# Patient Record
Sex: MALE | Race: WHITE | NOT HISPANIC OR LATINO
[De-identification: names, ages, dates, MRNs, and addresses within clinical notes are randomized per-mention and may not be internally consistent; named-entity substitution may affect disease eponyms.]

---

## 2017-06-20 ENCOUNTER — RESULT REVIEW (OUTPATIENT)
Age: 77
End: 2017-06-20

## 2017-08-01 ENCOUNTER — RESULT REVIEW (OUTPATIENT)
Age: 77
End: 2017-08-01

## 2017-09-06 RX ORDER — ACETAMINOPHEN 500 MG
975 TABLET ORAL ONCE
Qty: 0 | Refills: 0 | Status: COMPLETED | OUTPATIENT
Start: 2017-09-07 | End: 2017-09-07

## 2017-09-06 RX ORDER — FAMOTIDINE 10 MG/ML
20 INJECTION INTRAVENOUS ONCE
Qty: 0 | Refills: 0 | Status: COMPLETED | OUTPATIENT
Start: 2017-09-07 | End: 2017-09-07

## 2017-09-06 NOTE — ASU PATIENT PROFILE, ADULT - PMH
Deep vein thrombosis  post op  Depression    Gastroesophageal reflux disease    Hypertension    Osteoarthritis

## 2017-09-07 ENCOUNTER — OUTPATIENT (OUTPATIENT)
Dept: OUTPATIENT SERVICES | Facility: HOSPITAL | Age: 77
LOS: 1 days | Discharge: HOME | End: 2017-09-07

## 2017-09-07 VITALS
DIASTOLIC BLOOD PRESSURE: 82 MMHG | TEMPERATURE: 98 F | HEART RATE: 82 BPM | RESPIRATION RATE: 14 BRPM | SYSTOLIC BLOOD PRESSURE: 145 MMHG | OXYGEN SATURATION: 67 %

## 2017-09-07 VITALS
DIASTOLIC BLOOD PRESSURE: 88 MMHG | HEART RATE: 72 BPM | SYSTOLIC BLOOD PRESSURE: 148 MMHG | WEIGHT: 235.45 LBS | RESPIRATION RATE: 15 BRPM | TEMPERATURE: 99 F

## 2017-09-07 DIAGNOSIS — Z96.653 PRESENCE OF ARTIFICIAL KNEE JOINT, BILATERAL: Chronic | ICD-10-CM

## 2017-09-07 DIAGNOSIS — H26.9 UNSPECIFIED CATARACT: Chronic | ICD-10-CM

## 2017-09-07 DIAGNOSIS — Z98.52 VASECTOMY STATUS: Chronic | ICD-10-CM

## 2017-09-07 LAB
ANION GAP SERPL CALC-SCNC: 11 MMOL/L — SIGNIFICANT CHANGE UP (ref 5–17)
APTT BLD: 30.9 SEC — SIGNIFICANT CHANGE UP (ref 27.5–37.4)
BUN SERPL-MCNC: 23 MG/DL — SIGNIFICANT CHANGE UP (ref 7–23)
CALCIUM SERPL-MCNC: 8.9 MG/DL — SIGNIFICANT CHANGE UP (ref 8.5–10.1)
CHLORIDE SERPL-SCNC: 103 MMOL/L — SIGNIFICANT CHANGE UP (ref 96–108)
CO2 SERPL-SCNC: 24 MMOL/L — SIGNIFICANT CHANGE UP (ref 22–31)
CREAT SERPL-MCNC: 1.1 MG/DL — SIGNIFICANT CHANGE UP (ref 0.5–1.3)
GLUCOSE SERPL-MCNC: 105 MG/DL — HIGH (ref 70–99)
HCT VFR BLD CALC: 43.7 % — SIGNIFICANT CHANGE UP (ref 39–50)
HGB BLD-MCNC: 15 G/DL — SIGNIFICANT CHANGE UP (ref 13–17)
INR BLD: 1.06 RATIO — SIGNIFICANT CHANGE UP (ref 0.88–1.16)
MCHC RBC-ENTMCNC: 31.2 PG — SIGNIFICANT CHANGE UP (ref 27–34)
MCHC RBC-ENTMCNC: 34.4 GM/DL — SIGNIFICANT CHANGE UP (ref 32–36)
MCV RBC AUTO: 90.6 FL — SIGNIFICANT CHANGE UP (ref 80–100)
PLATELET # BLD AUTO: 316 K/UL — SIGNIFICANT CHANGE UP (ref 150–400)
POTASSIUM SERPL-MCNC: 3.5 MMOL/L — SIGNIFICANT CHANGE UP (ref 3.5–5.3)
POTASSIUM SERPL-SCNC: 3.5 MMOL/L — SIGNIFICANT CHANGE UP (ref 3.5–5.3)
PROTHROM AB SERPL-ACNC: 11.5 SEC — SIGNIFICANT CHANGE UP (ref 9.8–12.7)
RBC # BLD: 4.82 M/UL — SIGNIFICANT CHANGE UP (ref 4.2–5.8)
RBC # FLD: 11.7 % — SIGNIFICANT CHANGE UP (ref 10.3–14.5)
SODIUM SERPL-SCNC: 138 MMOL/L — SIGNIFICANT CHANGE UP (ref 135–145)
WBC # BLD: 9.1 K/UL — SIGNIFICANT CHANGE UP (ref 3.8–10.5)
WBC # FLD AUTO: 9.1 K/UL — SIGNIFICANT CHANGE UP (ref 3.8–10.5)

## 2017-09-07 RX ORDER — METOPROLOL TARTRATE 50 MG
50 TABLET ORAL EVERY 12 HOURS
Qty: 0 | Refills: 0 | Status: DISCONTINUED | OUTPATIENT
Start: 2017-09-07 | End: 2017-09-22

## 2017-09-07 RX ORDER — ACETAMINOPHEN 500 MG
1000 TABLET ORAL ONCE
Qty: 0 | Refills: 0 | Status: DISCONTINUED | OUTPATIENT
Start: 2017-09-07 | End: 2017-09-07

## 2017-09-07 RX ORDER — SODIUM CHLORIDE 9 MG/ML
1000 INJECTION INTRAMUSCULAR; INTRAVENOUS; SUBCUTANEOUS
Qty: 0 | Refills: 0 | Status: DISCONTINUED | OUTPATIENT
Start: 2017-09-07 | End: 2017-09-07

## 2017-09-07 RX ORDER — ONDANSETRON 8 MG/1
4 TABLET, FILM COATED ORAL EVERY 6 HOURS
Qty: 0 | Refills: 0 | Status: DISCONTINUED | OUTPATIENT
Start: 2017-09-07 | End: 2017-09-22

## 2017-09-07 RX ORDER — OXYCODONE HYDROCHLORIDE 5 MG/1
1 TABLET ORAL
Qty: 30 | Refills: 0 | OUTPATIENT
Start: 2017-09-07 | End: 2017-09-12

## 2017-09-07 RX ORDER — MUPIROCIN 20 MG/G
1 OINTMENT TOPICAL
Qty: 0 | Refills: 0 | COMMUNITY
Start: 2017-09-07 | End: 2017-09-11

## 2017-09-07 RX ORDER — OXYCODONE HYDROCHLORIDE 5 MG/1
5 TABLET ORAL EVERY 6 HOURS
Qty: 0 | Refills: 0 | Status: DISCONTINUED | OUTPATIENT
Start: 2017-09-07 | End: 2017-09-07

## 2017-09-07 RX ORDER — FENTANYL CITRATE 50 UG/ML
50 INJECTION INTRAVENOUS
Qty: 0 | Refills: 0 | Status: DISCONTINUED | OUTPATIENT
Start: 2017-09-07 | End: 2017-09-07

## 2017-09-07 RX ORDER — ONDANSETRON 8 MG/1
4 TABLET, FILM COATED ORAL ONCE
Qty: 0 | Refills: 0 | Status: DISCONTINUED | OUTPATIENT
Start: 2017-09-07 | End: 2017-09-07

## 2017-09-07 RX ADMIN — Medication 975 MILLIGRAM(S): at 06:55

## 2017-09-07 RX ADMIN — SODIUM CHLORIDE 75 MILLILITER(S): 9 INJECTION INTRAMUSCULAR; INTRAVENOUS; SUBCUTANEOUS at 08:45

## 2017-09-07 RX ADMIN — FAMOTIDINE 20 MILLIGRAM(S): 10 INJECTION INTRAVENOUS at 06:54

## 2017-09-07 NOTE — ASU DISCHARGE PLAN (ADULT/PEDIATRIC). - MEDICATION SUMMARY - MEDICATIONS TO TAKE
I will START or STAY ON the medications listed below when I get home from the hospital:    CeleBREX 200 mg oral capsule  --  by mouth   -- Indication: For VENTRAL HERNIA    citalopram 10 mg oral tablet  --  by mouth   -- Indication: For VENTRAL HERNIA    losartan-hydrochlorothiazide 100mg-25mg oral tablet  --  by mouth   -- Indication: For VENTRAL HERNIA    Bactroban 2% nasal ointment  -- 1 application into nose 2 times a day  -- Indication: For VENTRAL HERNIA    cinnamon 500 mg oral capsule  --  by mouth   -- Indication: For VENTRAL HERNIA    omega-3 polyunsaturated fatty acids 1200 mg oral capsule  --  by mouth   -- Indication: For VENTRAL HERNIA    Coenzyme Q10 60 mg oral tablet  --  by mouth   -- Indication: For VENTRAL HERNIA    pantoprazole 40 mg oral delayed release tablet  --  by mouth   -- Indication: For VENTRAL HERNIA    multivitamin  --     -- Indication: For VENTRAL HERNIA    Vitamin D3 1000 intl units oral capsule  --  by mouth   -- Indication: For VENTRAL HERNIA

## 2017-09-07 NOTE — BRIEF OPERATIVE NOTE - POST-OP DX
Umbilical hernia without obstruction or gangrene  09/07/2017    Walter Macedo  Ventral hernia without obstruction or gangrene  09/07/2017    Walter Macedo

## 2017-09-07 NOTE — BRIEF OPERATIVE NOTE - PROCEDURE
Umbilical hernia repair with mesh  09/07/2017    Active  RZINGALE  Ventral hernia repair with mesh  09/07/2017    Active  RZINGALE

## 2017-09-14 DIAGNOSIS — Z88.0 ALLERGY STATUS TO PENICILLIN: ICD-10-CM

## 2017-09-14 DIAGNOSIS — K21.9 GASTRO-ESOPHAGEAL REFLUX DISEASE WITHOUT ESOPHAGITIS: ICD-10-CM

## 2017-09-14 DIAGNOSIS — I10 ESSENTIAL (PRIMARY) HYPERTENSION: ICD-10-CM

## 2017-09-14 DIAGNOSIS — Z88.5 ALLERGY STATUS TO NARCOTIC AGENT: ICD-10-CM

## 2017-09-14 DIAGNOSIS — Z86.718 PERSONAL HISTORY OF OTHER VENOUS THROMBOSIS AND EMBOLISM: ICD-10-CM

## 2017-09-14 DIAGNOSIS — K42.9 UMBILICAL HERNIA WITHOUT OBSTRUCTION OR GANGRENE: ICD-10-CM

## 2017-11-27 PROBLEM — I10 ESSENTIAL (PRIMARY) HYPERTENSION: Chronic | Status: ACTIVE | Noted: 2017-09-06

## 2017-11-27 PROBLEM — F32.9 MAJOR DEPRESSIVE DISORDER, SINGLE EPISODE, UNSPECIFIED: Chronic | Status: ACTIVE | Noted: 2017-09-06

## 2017-11-27 PROBLEM — K21.9 GASTRO-ESOPHAGEAL REFLUX DISEASE WITHOUT ESOPHAGITIS: Chronic | Status: ACTIVE | Noted: 2017-09-06

## 2017-11-27 PROBLEM — M19.90 UNSPECIFIED OSTEOARTHRITIS, UNSPECIFIED SITE: Chronic | Status: ACTIVE | Noted: 2017-09-06

## 2017-12-01 ENCOUNTER — FORM ENCOUNTER (OUTPATIENT)
Age: 77
End: 2017-12-01

## 2017-12-01 ENCOUNTER — APPOINTMENT (OUTPATIENT)
Dept: ORTHOPEDIC SURGERY | Facility: CLINIC | Age: 77
End: 2017-12-01
Payer: MEDICARE

## 2017-12-01 VITALS
BODY MASS INDEX: 33.59 KG/M2 | WEIGHT: 248 LBS | SYSTOLIC BLOOD PRESSURE: 156 MMHG | HEIGHT: 72 IN | HEART RATE: 75 BPM | DIASTOLIC BLOOD PRESSURE: 97 MMHG | TEMPERATURE: 98.7 F

## 2017-12-01 DIAGNOSIS — Z96.652 UNSPECIFIED COMPLICATION OF INTERNAL ORTHOPEDIC PROSTHETIC DEVICE, IMPLANT AND GRAFT, INITIAL ENCOUNTER: ICD-10-CM

## 2017-12-01 DIAGNOSIS — Z96.652 PRESENCE OF LEFT ARTIFICIAL KNEE JOINT: ICD-10-CM

## 2017-12-01 DIAGNOSIS — T84.9XXA UNSPECIFIED COMPLICATION OF INTERNAL ORTHOPEDIC PROSTHETIC DEVICE, IMPLANT AND GRAFT, INITIAL ENCOUNTER: ICD-10-CM

## 2017-12-01 DIAGNOSIS — M25.462 EFFUSION, LEFT KNEE: ICD-10-CM

## 2017-12-01 PROCEDURE — 99203 OFFICE O/P NEW LOW 30 MIN: CPT | Mod: 25

## 2017-12-01 PROCEDURE — 73562 X-RAY EXAM OF KNEE 3: CPT | Mod: LT

## 2017-12-01 PROCEDURE — 20610 DRAIN/INJ JOINT/BURSA W/O US: CPT | Mod: LT

## 2017-12-01 RX ORDER — CITALOPRAM HYDROBROMIDE 20 MG/1
20 TABLET, FILM COATED ORAL
Qty: 30 | Refills: 0 | Status: ACTIVE | COMMUNITY
Start: 2017-02-14

## 2017-12-01 RX ORDER — OMEPRAZOLE 40 MG/1
40 CAPSULE, DELAYED RELEASE ORAL
Qty: 30 | Refills: 0 | Status: ACTIVE | COMMUNITY
Start: 2017-04-18

## 2017-12-01 RX ORDER — LOSARTAN POTASSIUM AND HYDROCHLOROTHIAZIDE 25; 100 MG/1; MG/1
100-25 TABLET ORAL
Qty: 30 | Refills: 0 | Status: ACTIVE | COMMUNITY
Start: 2017-04-18

## 2017-12-01 RX ORDER — TAMSULOSIN HYDROCHLORIDE 0.4 MG/1
0.4 CAPSULE ORAL
Qty: 30 | Refills: 0 | Status: ACTIVE | COMMUNITY
Start: 2017-07-07

## 2017-12-01 RX ORDER — OFLOXACIN 3 MG/ML
0.3 SOLUTION/ DROPS OPHTHALMIC
Qty: 10 | Refills: 0 | Status: ACTIVE | COMMUNITY
Start: 2017-08-16

## 2017-12-02 ENCOUNTER — APPOINTMENT (OUTPATIENT)
Dept: CT IMAGING | Facility: CLINIC | Age: 77
End: 2017-12-02
Payer: MEDICARE

## 2017-12-02 ENCOUNTER — OUTPATIENT (OUTPATIENT)
Dept: OUTPATIENT SERVICES | Facility: HOSPITAL | Age: 77
LOS: 1 days | End: 2017-12-02
Payer: MEDICARE

## 2017-12-02 DIAGNOSIS — Z96.653 PRESENCE OF ARTIFICIAL KNEE JOINT, BILATERAL: Chronic | ICD-10-CM

## 2017-12-02 DIAGNOSIS — M25.462 EFFUSION, LEFT KNEE: ICD-10-CM

## 2017-12-02 DIAGNOSIS — Z98.52 VASECTOMY STATUS: Chronic | ICD-10-CM

## 2017-12-02 DIAGNOSIS — H26.9 UNSPECIFIED CATARACT: Chronic | ICD-10-CM

## 2017-12-02 PROCEDURE — 73700 CT LOWER EXTREMITY W/O DYE: CPT

## 2017-12-02 PROCEDURE — 73700 CT LOWER EXTREMITY W/O DYE: CPT | Mod: 26,LT

## 2017-12-04 ENCOUNTER — APPOINTMENT (OUTPATIENT)
Dept: CT IMAGING | Facility: CLINIC | Age: 77
End: 2017-12-04

## 2017-12-05 PROBLEM — T84.9XXA: Status: ACTIVE | Noted: 2017-12-05

## 2017-12-05 LAB
B PERT IGG+IGM PNL SER: NORMAL
COLOR FLD: NORMAL
EOSINOPHIL # FLD MANUAL: 1 %
FLUID INTAKE SUBSTANCE CLASS: NORMAL
LYMPHOCYTES # FLD MANUAL: 17 %
MONOS+MACROS NFR FLD MANUAL: 9 %
NEUTS SEG # FLD MANUAL: 73 %
RBC # FLD MANUAL: ABNORMAL /UL
TOTAL CELLS COUNTED FLD: 1454 /UL
TUBE TYPE: NORMAL

## 2017-12-11 LAB — BACTERIA FLD CULT: NORMAL

## 2017-12-18 ENCOUNTER — APPOINTMENT (OUTPATIENT)
Dept: ORTHOPEDIC SURGERY | Facility: HOSPITAL | Age: 77
End: 2017-12-18

## 2018-01-02 ENCOUNTER — RESULT REVIEW (OUTPATIENT)
Age: 78
End: 2018-01-02

## 2018-01-02 ENCOUNTER — OUTPATIENT (OUTPATIENT)
Dept: OUTPATIENT SERVICES | Facility: HOSPITAL | Age: 78
LOS: 1 days | End: 2018-01-02
Payer: MEDICARE

## 2018-01-02 VITALS
WEIGHT: 255.74 LBS | HEART RATE: 77 BPM | TEMPERATURE: 98 F | HEIGHT: 70 IN | RESPIRATION RATE: 14 BRPM | DIASTOLIC BLOOD PRESSURE: 88 MMHG | SYSTOLIC BLOOD PRESSURE: 130 MMHG

## 2018-01-02 DIAGNOSIS — T84.9XXA UNSPECIFIED COMPLICATION OF INTERNAL ORTHOPEDIC PROSTHETIC DEVICE, IMPLANT AND GRAFT, INITIAL ENCOUNTER: ICD-10-CM

## 2018-01-02 DIAGNOSIS — Z98.49 CATARACT EXTRACTION STATUS, UNSPECIFIED EYE: Chronic | ICD-10-CM

## 2018-01-02 DIAGNOSIS — Z98.890 OTHER SPECIFIED POSTPROCEDURAL STATES: Chronic | ICD-10-CM

## 2018-01-02 DIAGNOSIS — H26.9 UNSPECIFIED CATARACT: Chronic | ICD-10-CM

## 2018-01-02 DIAGNOSIS — Z96.653 PRESENCE OF ARTIFICIAL KNEE JOINT, BILATERAL: Chronic | ICD-10-CM

## 2018-01-02 DIAGNOSIS — Z96.652 PRESENCE OF LEFT ARTIFICIAL KNEE JOINT: ICD-10-CM

## 2018-01-02 DIAGNOSIS — Z01.818 ENCOUNTER FOR OTHER PREPROCEDURAL EXAMINATION: ICD-10-CM

## 2018-01-02 DIAGNOSIS — M19.90 UNSPECIFIED OSTEOARTHRITIS, UNSPECIFIED SITE: ICD-10-CM

## 2018-01-02 DIAGNOSIS — Z98.52 VASECTOMY STATUS: Chronic | ICD-10-CM

## 2018-01-02 LAB
ALBUMIN SERPL ELPH-MCNC: 3.6 G/DL — SIGNIFICANT CHANGE UP (ref 3.3–5)
ALP SERPL-CCNC: 50 U/L — SIGNIFICANT CHANGE UP (ref 30–120)
ALT FLD-CCNC: 41 U/L DA — SIGNIFICANT CHANGE UP (ref 10–60)
ANION GAP SERPL CALC-SCNC: 8 MMOL/L — SIGNIFICANT CHANGE UP (ref 5–17)
APTT BLD: 32.1 SEC — SIGNIFICANT CHANGE UP (ref 27.5–37.4)
AST SERPL-CCNC: 27 U/L — SIGNIFICANT CHANGE UP (ref 10–40)
BILIRUB SERPL-MCNC: 0.8 MG/DL — SIGNIFICANT CHANGE UP (ref 0.2–1.2)
BLD GP AB SCN SERPL QL: SIGNIFICANT CHANGE UP
BUN SERPL-MCNC: 28 MG/DL — HIGH (ref 7–23)
CALCIUM SERPL-MCNC: 9.2 MG/DL — SIGNIFICANT CHANGE UP (ref 8.4–10.5)
CHLORIDE SERPL-SCNC: 104 MMOL/L — SIGNIFICANT CHANGE UP (ref 96–108)
CO2 SERPL-SCNC: 29 MMOL/L — SIGNIFICANT CHANGE UP (ref 22–31)
CREAT SERPL-MCNC: 1.09 MG/DL — SIGNIFICANT CHANGE UP (ref 0.5–1.3)
GLUCOSE SERPL-MCNC: 113 MG/DL — HIGH (ref 70–99)
HCT VFR BLD CALC: 41.1 % — SIGNIFICANT CHANGE UP (ref 39–50)
HGB BLD-MCNC: 14 G/DL — SIGNIFICANT CHANGE UP (ref 13–17)
INR BLD: 1.05 RATIO — SIGNIFICANT CHANGE UP (ref 0.88–1.16)
MCHC RBC-ENTMCNC: 30.5 PG — SIGNIFICANT CHANGE UP (ref 27–34)
MCHC RBC-ENTMCNC: 34 GM/DL — SIGNIFICANT CHANGE UP (ref 32–36)
MCV RBC AUTO: 89.9 FL — SIGNIFICANT CHANGE UP (ref 80–100)
MRSA PCR RESULT.: SIGNIFICANT CHANGE UP
PLATELET # BLD AUTO: 299 K/UL — SIGNIFICANT CHANGE UP (ref 150–400)
POTASSIUM SERPL-MCNC: 4.2 MMOL/L — SIGNIFICANT CHANGE UP (ref 3.5–5.3)
POTASSIUM SERPL-SCNC: 4.2 MMOL/L — SIGNIFICANT CHANGE UP (ref 3.5–5.3)
PROT SERPL-MCNC: 7.9 G/DL — SIGNIFICANT CHANGE UP (ref 6–8.3)
PROTHROM AB SERPL-ACNC: 11.5 SEC — SIGNIFICANT CHANGE UP (ref 9.8–12.7)
RBC # BLD: 4.58 M/UL — SIGNIFICANT CHANGE UP (ref 4.2–5.8)
RBC # FLD: 12 % — SIGNIFICANT CHANGE UP (ref 10.3–14.5)
S AUREUS DNA NOSE QL NAA+PROBE: DETECTED
SODIUM SERPL-SCNC: 141 MMOL/L — SIGNIFICANT CHANGE UP (ref 135–145)
WBC # BLD: 7 K/UL — SIGNIFICANT CHANGE UP (ref 3.8–10.5)
WBC # FLD AUTO: 7 K/UL — SIGNIFICANT CHANGE UP (ref 3.8–10.5)

## 2018-01-02 PROCEDURE — 93010 ELECTROCARDIOGRAM REPORT: CPT | Mod: NC

## 2018-01-02 PROCEDURE — 85730 THROMBOPLASTIN TIME PARTIAL: CPT

## 2018-01-02 PROCEDURE — 86901 BLOOD TYPING SEROLOGIC RH(D): CPT

## 2018-01-02 PROCEDURE — 87640 STAPH A DNA AMP PROBE: CPT

## 2018-01-02 PROCEDURE — 86900 BLOOD TYPING SEROLOGIC ABO: CPT

## 2018-01-02 PROCEDURE — 87641 MR-STAPH DNA AMP PROBE: CPT

## 2018-01-02 PROCEDURE — 85610 PROTHROMBIN TIME: CPT

## 2018-01-02 PROCEDURE — 86850 RBC ANTIBODY SCREEN: CPT

## 2018-01-02 PROCEDURE — 80053 COMPREHEN METABOLIC PANEL: CPT

## 2018-01-02 PROCEDURE — 93005 ELECTROCARDIOGRAM TRACING: CPT

## 2018-01-02 PROCEDURE — 36415 COLL VENOUS BLD VENIPUNCTURE: CPT

## 2018-01-02 PROCEDURE — G0463: CPT

## 2018-01-02 PROCEDURE — 85027 COMPLETE CBC AUTOMATED: CPT

## 2018-01-02 RX ORDER — CHOLECALCIFEROL (VITAMIN D3) 125 MCG
0 CAPSULE ORAL
Qty: 0 | Refills: 0 | COMMUNITY

## 2018-01-02 RX ORDER — CITALOPRAM 10 MG/1
0 TABLET, FILM COATED ORAL
Qty: 0 | Refills: 0 | COMMUNITY

## 2018-01-02 RX ORDER — UBIDECARENONE 100 MG
0 CAPSULE ORAL
Qty: 0 | Refills: 0 | COMMUNITY

## 2018-01-02 RX ORDER — CINNAMON BARK 500 MG
0 CAPSULE ORAL
Qty: 0 | Refills: 0 | COMMUNITY

## 2018-01-02 RX ORDER — PANTOPRAZOLE SODIUM 20 MG/1
0 TABLET, DELAYED RELEASE ORAL
Qty: 0 | Refills: 0 | COMMUNITY

## 2018-01-02 RX ORDER — CELECOXIB 200 MG/1
0 CAPSULE ORAL
Qty: 0 | Refills: 0 | COMMUNITY

## 2018-01-02 NOTE — H&P PST ADULT - FAMILY HISTORY
Family history of lung cancer     Mother  Still living? No  Family history of brain tumor, Age at diagnosis: Age Unknown

## 2018-01-02 NOTE — H&P PST ADULT - NSANTHOSAYNRD_GEN_A_CORE
No. GERMAN screening performed.  STOP BANG Legend: 0-2 = LOW Risk; 3-4 = INTERMEDIATE Risk; 5-8 = HIGH Risk

## 2018-01-02 NOTE — H&P PST ADULT - PSH
H/O total knee replacement, bilateral  left knee replacemt 2001, revision left knee replacement using cement 2010 ? h/o staph and strep infection   right knee replacement 2001 , revision right knee replacement 2002?, multiple right  knee surgeries done due to staph infection  H/O vasectomy    S/P cataract surgery  bilateral 2017  S/P hernia repair  2017

## 2018-01-02 NOTE — H&P PST ADULT - HISTORY OF PRESENT ILLNESS
This is a 76 y/o male who had undergone revision left total knee replacement using cement  5-6 years ago presents with complaint of left knee pain , swelling and buckling . patient states 2 months ago he missed on stoop and twisted his left knee , since then he has been experiencing severe shocking pain, knee giving away  and worse pain 8/10 with steps up and down and sitting long period of time . patient had multiple knee surgeries in the past due to staph and strep infections in his knee replacement . scheduled for revision left knee replacement on 1/18/18

## 2018-01-02 NOTE — H&P PST ADULT - RS GEN PE MLT RESP DETAILS PC
clear to auscultation bilaterally/good air movement/no rhonchi/no rales/breath sounds equal/no wheezes

## 2018-01-02 NOTE — H&P PST ADULT - PMH
Deep vein thrombosis  right leg s/p right knee replacement 2007 treated with coumadin x 6 months  Depression    Gastroesophageal reflux disease    Hypertension    Osteoarthritis

## 2018-01-03 PROBLEM — I82.409 ACUTE EMBOLISM AND THROMBOSIS OF UNSPECIFIED DEEP VEINS OF UNSPECIFIED LOWER EXTREMITY: Chronic | Status: ACTIVE | Noted: 2017-09-06

## 2018-01-03 RX ORDER — MUPIROCIN 20 MG/G
1 OINTMENT TOPICAL
Qty: 1 | Refills: 0 | OUTPATIENT
Start: 2018-01-03 | End: 2018-01-07

## 2018-01-03 NOTE — PROGRESS NOTE ADULT - SUBJECTIVE AND OBJECTIVE BOX
Nasal culture PCR results: staph aureus detected  Topical Mupirocin escribed  Spoke to patient; understands treatment

## 2018-01-09 RX ORDER — APREPITANT 80 MG/1
40 CAPSULE ORAL ONCE
Qty: 0 | Refills: 0 | Status: COMPLETED | OUTPATIENT
Start: 2018-01-18 | End: 2018-01-18

## 2018-01-09 RX ORDER — SODIUM CHLORIDE 9 MG/ML
1000 INJECTION, SOLUTION INTRAVENOUS
Qty: 0 | Refills: 0 | Status: DISCONTINUED | OUTPATIENT
Start: 2018-01-18 | End: 2018-01-18

## 2018-01-17 RX ORDER — SENNA PLUS 8.6 MG/1
2 TABLET ORAL AT BEDTIME
Qty: 0 | Refills: 0 | Status: DISCONTINUED | OUTPATIENT
Start: 2018-01-18 | End: 2018-01-20

## 2018-01-17 RX ORDER — MAGNESIUM HYDROXIDE 400 MG/1
30 TABLET, CHEWABLE ORAL DAILY
Qty: 0 | Refills: 0 | Status: DISCONTINUED | OUTPATIENT
Start: 2018-01-18 | End: 2018-01-20

## 2018-01-17 RX ORDER — DOCUSATE SODIUM 100 MG
100 CAPSULE ORAL THREE TIMES A DAY
Qty: 0 | Refills: 0 | Status: DISCONTINUED | OUTPATIENT
Start: 2018-01-18 | End: 2018-01-20

## 2018-01-17 RX ORDER — SODIUM CHLORIDE 9 MG/ML
1000 INJECTION, SOLUTION INTRAVENOUS
Qty: 0 | Refills: 0 | Status: DISCONTINUED | OUTPATIENT
Start: 2018-01-18 | End: 2018-01-20

## 2018-01-17 RX ORDER — ONDANSETRON 8 MG/1
4 TABLET, FILM COATED ORAL EVERY 6 HOURS
Qty: 0 | Refills: 0 | Status: DISCONTINUED | OUTPATIENT
Start: 2018-01-18 | End: 2018-01-20

## 2018-01-17 NOTE — PROGRESS NOTE ADULT - SUBJECTIVE AND OBJECTIVE BOX
Presurgical evaluation:    Allergies:  penicillin: Hives  Intolerance:  codeine: Nausea    Home Medications: (based on H&P only; please confirm with patient and clearances)  · 	Vitamin D3 1000 intl units orally once a day  · 	pantoprazole 40 mg once a day  · 	CeleBREX 200 mg once a day  · 	Cinnamon 2 times a day  · 	citalopram 20 mg once a day  · 	Co Q-10 200mg once a day  · 	multivitamin once a day  · 	losartan-hydrochlorothiazide 100mg-25mg once a day  · 	tamsulosin 0.4 mg once a day  · 	biotin 5000 mcg once a day  · 	omega-3 polyunsaturated fatty acids 1200 mg     Past Medical History:  Deep vein thrombosis  right leg s/p right knee replacement 2007 treated with coumadin x 6 months  Depression    Gastroesophageal reflux disease    Hypertension    Osteoarthritis.    Past Surgical History:  H/O total knee replacement, bilateral  left knee replacemt 2001, revision left knee replacement using cement 2010 ? h/o staph and strep infection   right knee replacement 2001 , revision right knee replacement 2002?, multiple right  knee surgeries done due to staph infection  S/P hernia repair  2017    PST values of interest:  SCr 1.09 mg/dL (WNL)  LFTs WNL    Recommendations:  1.	IV TXA  2.	Acetaminophen and celecoxib for multimodal pain management  3.	VTE prophlaxis: (Caprini 12): Eliquis 2.5mg q12h x 12 days, then ASA EC 162mg q12h x 4 weeks Presurgical evaluation:    Allergies:  penicillin: Hives  Intolerance:  codeine: Nausea    Home Medications: (based on H&P only; please confirm with patient and clearances)  · 	Vitamin D3 1000 intl units orally once a day  · 	pantoprazole 40 mg once a day  · 	CeleBREX 200 mg once a day  · 	Cinnamon 2 times a day  · 	citalopram 20 mg once a day  · 	Co Q-10 200mg once a day  · 	multivitamin once a day  · 	losartan-hydrochlorothiazide 100mg-25mg once a day  · 	tamsulosin 0.4 mg once a day  · 	biotin 5000 mcg once a day  · 	omega-3 polyunsaturated fatty acids 1200 mg     Past Medical History:  Deep vein thrombosis  right leg s/p right knee replacement 2007 treated with coumadin x 6 months  Depression    Gastroesophageal reflux disease    Hypertension    Osteoarthritis.    Past Surgical History:  H/O total knee replacement, bilateral  left knee replacemt 2001, revision left knee replacement using cement 2010 ? h/o staph and strep infection   right knee replacement 2001 , revision right knee replacement 2002?, multiple right  knee surgeries done due to staph infection  S/P hernia repair  2017    PST values of interest:  SCr 1.09 mg/dL (WNL)  LFTs WNL    Recommendations:  1.	IV TXA  2.	Acetaminophen and celecoxib for multimodal pain management  3.	VTE prophlaxis: (Caprini 12): Eliquis 2.5mg q12h x 12 days, then ASA EC 162mg q12h x 4 weeks  4.	Hold supplements until Aspirin 162mg q12h completed

## 2018-01-18 ENCOUNTER — APPOINTMENT (OUTPATIENT)
Dept: ORTHOPEDIC SURGERY | Facility: HOSPITAL | Age: 78
End: 2018-01-18

## 2018-01-18 ENCOUNTER — RESULT REVIEW (OUTPATIENT)
Age: 78
End: 2018-01-18

## 2018-01-18 ENCOUNTER — INPATIENT (INPATIENT)
Facility: HOSPITAL | Age: 78
LOS: 1 days | Discharge: HOME | DRG: 468 | End: 2018-01-20
Attending: ORTHOPAEDIC SURGERY | Admitting: ORTHOPAEDIC SURGERY
Payer: MEDICARE

## 2018-01-18 VITALS
OXYGEN SATURATION: 98 % | SYSTOLIC BLOOD PRESSURE: 142 MMHG | DIASTOLIC BLOOD PRESSURE: 83 MMHG | HEIGHT: 72 IN | HEART RATE: 81 BPM | RESPIRATION RATE: 19 BRPM | WEIGHT: 242.51 LBS | TEMPERATURE: 98 F

## 2018-01-18 DIAGNOSIS — Z96.652 PRESENCE OF LEFT ARTIFICIAL KNEE JOINT: ICD-10-CM

## 2018-01-18 DIAGNOSIS — K21.9 GASTRO-ESOPHAGEAL REFLUX DISEASE WITHOUT ESOPHAGITIS: ICD-10-CM

## 2018-01-18 DIAGNOSIS — N40.0 BENIGN PROSTATIC HYPERPLASIA WITHOUT LOWER URINARY TRACT SYMPTOMS: ICD-10-CM

## 2018-01-18 DIAGNOSIS — Z98.890 OTHER SPECIFIED POSTPROCEDURAL STATES: Chronic | ICD-10-CM

## 2018-01-18 DIAGNOSIS — M19.90 UNSPECIFIED OSTEOARTHRITIS, UNSPECIFIED SITE: ICD-10-CM

## 2018-01-18 DIAGNOSIS — T84.9XXA UNSPECIFIED COMPLICATION OF INTERNAL ORTHOPEDIC PROSTHETIC DEVICE, IMPLANT AND GRAFT, INITIAL ENCOUNTER: ICD-10-CM

## 2018-01-18 DIAGNOSIS — F32.9 MAJOR DEPRESSIVE DISORDER, SINGLE EPISODE, UNSPECIFIED: ICD-10-CM

## 2018-01-18 DIAGNOSIS — Z96.653 PRESENCE OF ARTIFICIAL KNEE JOINT, BILATERAL: Chronic | ICD-10-CM

## 2018-01-18 DIAGNOSIS — Z98.49 CATARACT EXTRACTION STATUS, UNSPECIFIED EYE: Chronic | ICD-10-CM

## 2018-01-18 DIAGNOSIS — Z98.52 VASECTOMY STATUS: Chronic | ICD-10-CM

## 2018-01-18 DIAGNOSIS — I10 ESSENTIAL (PRIMARY) HYPERTENSION: ICD-10-CM

## 2018-01-18 LAB
ANION GAP SERPL CALC-SCNC: 10 MMOL/L — SIGNIFICANT CHANGE UP (ref 5–17)
B PERT IGG+IGM PNL SER: ABNORMAL
BUN SERPL-MCNC: 20 MG/DL — SIGNIFICANT CHANGE UP (ref 7–23)
CALCIUM SERPL-MCNC: 8.9 MG/DL — SIGNIFICANT CHANGE UP (ref 8.4–10.5)
CHLORIDE SERPL-SCNC: 105 MMOL/L — SIGNIFICANT CHANGE UP (ref 96–108)
CO2 SERPL-SCNC: 27 MMOL/L — SIGNIFICANT CHANGE UP (ref 22–31)
COLOR FLD: SIGNIFICANT CHANGE UP
CREAT SERPL-MCNC: 1.11 MG/DL — SIGNIFICANT CHANGE UP (ref 0.5–1.3)
FLUID INTAKE SUBSTANCE CLASS: SIGNIFICANT CHANGE UP
GLUCOSE SERPL-MCNC: 144 MG/DL — HIGH (ref 70–99)
HCT VFR BLD CALC: 41.8 % — SIGNIFICANT CHANGE UP (ref 39–50)
HGB BLD-MCNC: 13.8 G/DL — SIGNIFICANT CHANGE UP (ref 13–17)
MCHC RBC-ENTMCNC: 30.7 PG — SIGNIFICANT CHANGE UP (ref 27–34)
MCHC RBC-ENTMCNC: 33.1 GM/DL — SIGNIFICANT CHANGE UP (ref 32–36)
MCV RBC AUTO: 92.6 FL — SIGNIFICANT CHANGE UP (ref 80–100)
PLATELET # BLD AUTO: 328 K/UL — SIGNIFICANT CHANGE UP (ref 150–400)
POTASSIUM SERPL-MCNC: 4 MMOL/L — SIGNIFICANT CHANGE UP (ref 3.5–5.3)
POTASSIUM SERPL-SCNC: 4 MMOL/L — SIGNIFICANT CHANGE UP (ref 3.5–5.3)
RBC # BLD: 4.51 M/UL — SIGNIFICANT CHANGE UP (ref 4.2–5.8)
RBC # FLD: 11.4 % — SIGNIFICANT CHANGE UP (ref 10.3–14.5)
RCV VOL RI: HIGH /UL (ref 0–5)
SODIUM SERPL-SCNC: 142 MMOL/L — SIGNIFICANT CHANGE UP (ref 135–145)
TOTAL NUCLEATED CELL COUNT, BODY FLUID: 940 /UL — HIGH (ref 0–5)
TUBE TYPE: SIGNIFICANT CHANGE UP
WBC # BLD: 12.9 K/UL — HIGH (ref 3.8–10.5)
WBC # FLD AUTO: 12.9 K/UL — HIGH (ref 3.8–10.5)

## 2018-01-18 PROCEDURE — 88305 TISSUE EXAM BY PATHOLOGIST: CPT | Mod: 26

## 2018-01-18 PROCEDURE — 88300 SURGICAL PATH GROSS: CPT | Mod: 26

## 2018-01-18 PROCEDURE — 99223 1ST HOSP IP/OBS HIGH 75: CPT

## 2018-01-18 PROCEDURE — 27487 REVISE/REPLACE KNEE JOINT: CPT | Mod: LT

## 2018-01-18 PROCEDURE — 73562 X-RAY EXAM OF KNEE 3: CPT | Mod: 26,LT

## 2018-01-18 PROCEDURE — 88311 DECALCIFY TISSUE: CPT | Mod: 26

## 2018-01-18 PROCEDURE — 88302 TISSUE EXAM BY PATHOLOGIST: CPT | Mod: 26

## 2018-01-18 RX ORDER — HYDROMORPHONE HYDROCHLORIDE 2 MG/ML
0.5 INJECTION INTRAMUSCULAR; INTRAVENOUS; SUBCUTANEOUS
Qty: 0 | Refills: 0 | Status: DISCONTINUED | OUTPATIENT
Start: 2018-01-18 | End: 2018-01-18

## 2018-01-18 RX ORDER — ACETAMINOPHEN 500 MG
1000 TABLET ORAL ONCE
Qty: 0 | Refills: 0 | Status: COMPLETED | OUTPATIENT
Start: 2018-01-18 | End: 2018-01-18

## 2018-01-18 RX ORDER — VANCOMYCIN HCL 1 G
1750 VIAL (EA) INTRAVENOUS ONCE
Qty: 0 | Refills: 0 | Status: COMPLETED | OUTPATIENT
Start: 2018-01-18 | End: 2018-01-18

## 2018-01-18 RX ORDER — CELECOXIB 200 MG/1
200 CAPSULE ORAL
Qty: 0 | Refills: 0 | Status: DISCONTINUED | OUTPATIENT
Start: 2018-01-18 | End: 2018-01-19

## 2018-01-18 RX ORDER — PANTOPRAZOLE SODIUM 20 MG/1
40 TABLET, DELAYED RELEASE ORAL
Qty: 0 | Refills: 0 | Status: DISCONTINUED | OUTPATIENT
Start: 2018-01-18 | End: 2018-01-18

## 2018-01-18 RX ORDER — OXYCODONE HYDROCHLORIDE 5 MG/1
5 TABLET ORAL
Qty: 0 | Refills: 0 | Status: DISCONTINUED | OUTPATIENT
Start: 2018-01-18 | End: 2018-01-20

## 2018-01-18 RX ORDER — PANTOPRAZOLE SODIUM 20 MG/1
40 TABLET, DELAYED RELEASE ORAL
Qty: 0 | Refills: 0 | Status: DISCONTINUED | OUTPATIENT
Start: 2018-01-18 | End: 2018-01-20

## 2018-01-18 RX ORDER — SENNA PLUS 8.6 MG/1
1 TABLET ORAL AT BEDTIME
Qty: 0 | Refills: 0 | Status: DISCONTINUED | OUTPATIENT
Start: 2018-01-18 | End: 2018-01-18

## 2018-01-18 RX ORDER — ASPIRIN/CALCIUM CARB/MAGNESIUM 324 MG
162 TABLET ORAL EVERY 12 HOURS
Qty: 0 | Refills: 0 | Status: DISCONTINUED | OUTPATIENT
Start: 2018-01-19 | End: 2018-01-19

## 2018-01-18 RX ORDER — OXYCODONE HYDROCHLORIDE 5 MG/1
10 TABLET ORAL
Qty: 0 | Refills: 0 | Status: DISCONTINUED | OUTPATIENT
Start: 2018-01-18 | End: 2018-01-20

## 2018-01-18 RX ORDER — ACETAMINOPHEN 500 MG
1000 TABLET ORAL ONCE
Qty: 0 | Refills: 0 | Status: COMPLETED | OUTPATIENT
Start: 2018-01-19 | End: 2018-01-19

## 2018-01-18 RX ORDER — CITALOPRAM 10 MG/1
20 TABLET, FILM COATED ORAL DAILY
Qty: 0 | Refills: 0 | Status: DISCONTINUED | OUTPATIENT
Start: 2018-01-18 | End: 2018-01-20

## 2018-01-18 RX ORDER — SODIUM CHLORIDE 9 MG/ML
1000 INJECTION, SOLUTION INTRAVENOUS
Qty: 0 | Refills: 0 | Status: DISCONTINUED | OUTPATIENT
Start: 2018-01-18 | End: 2018-01-18

## 2018-01-18 RX ORDER — POLYETHYLENE GLYCOL 3350 17 G/17G
17 POWDER, FOR SOLUTION ORAL DAILY
Qty: 0 | Refills: 0 | Status: DISCONTINUED | OUTPATIENT
Start: 2018-01-18 | End: 2018-01-20

## 2018-01-18 RX ORDER — ONDANSETRON 8 MG/1
4 TABLET, FILM COATED ORAL ONCE
Qty: 0 | Refills: 0 | Status: DISCONTINUED | OUTPATIENT
Start: 2018-01-18 | End: 2018-01-20

## 2018-01-18 RX ORDER — CHOLECALCIFEROL (VITAMIN D3) 125 MCG
1000 CAPSULE ORAL DAILY
Qty: 0 | Refills: 0 | Status: DISCONTINUED | OUTPATIENT
Start: 2018-01-18 | End: 2018-01-20

## 2018-01-18 RX ORDER — TAMSULOSIN HYDROCHLORIDE 0.4 MG/1
0.4 CAPSULE ORAL AT BEDTIME
Qty: 0 | Refills: 0 | Status: DISCONTINUED | OUTPATIENT
Start: 2018-01-18 | End: 2018-01-20

## 2018-01-18 RX ADMIN — SODIUM CHLORIDE 75 MILLILITER(S): 9 INJECTION, SOLUTION INTRAVENOUS at 18:55

## 2018-01-18 RX ADMIN — APREPITANT 40 MILLIGRAM(S): 80 CAPSULE ORAL at 12:11

## 2018-01-18 RX ADMIN — SENNA PLUS 2 TABLET(S): 8.6 TABLET ORAL at 21:58

## 2018-01-18 RX ADMIN — Medication 400 MILLIGRAM(S): at 22:51

## 2018-01-18 RX ADMIN — SODIUM CHLORIDE 75 MILLILITER(S): 9 INJECTION, SOLUTION INTRAVENOUS at 12:11

## 2018-01-18 RX ADMIN — OXYCODONE HYDROCHLORIDE 10 MILLIGRAM(S): 5 TABLET ORAL at 22:00

## 2018-01-18 RX ADMIN — TAMSULOSIN HYDROCHLORIDE 0.4 MILLIGRAM(S): 0.4 CAPSULE ORAL at 21:58

## 2018-01-18 RX ADMIN — OXYCODONE HYDROCHLORIDE 10 MILLIGRAM(S): 5 TABLET ORAL at 22:30

## 2018-01-18 RX ADMIN — Medication 100 MILLIGRAM(S): at 21:58

## 2018-01-18 NOTE — CONSULT NOTE ADULT - ASSESSMENT
76 y/o male with pmh of htn, hld, bph, gerd, s/p revision of left knee day0, no acute complaints , pt states post op after receiving vanco pilo itching, no hives or rash visible

## 2018-01-18 NOTE — BRIEF OPERATIVE NOTE - PROCEDURE
<<-----Click on this checkbox to enter Procedure Revision total knee arthroplasty  01/18/2018    Active  NAVID

## 2018-01-18 NOTE — CONSULT NOTE ADULT - PROBLEM SELECTOR RECOMMENDATION 9
s/p left total knee replacement day 0  pt/ot  pain control  encourage ambulation, incentive spirometer  DVT prophalxis

## 2018-01-18 NOTE — CONSULT NOTE ADULT - SUBJECTIVE AND OBJECTIVE BOX
76 y/o male with pmh of htn, hld, bph, gerd, s/p revision of left knee day0, no acute complaints , pt states post op after receiving vanco pilo itching, no hives or rash visible            Allergies:  	penicillin: Drug, Hives  	codeine: Drug, Nausea    Home Medications:   · 	Vitamin D3 1000 intl units oral capsule: Last Dose Taken:  , orally once a day  · 	pantoprazole 40 mg oral delayed release tablet: Last Dose Taken:  , orally once a day  · 	CeleBREX 200 mg oral capsule: Last Dose Taken:  , orally once a day  · 	Cinnamon: Last Dose Taken:  , orally 2 times a day  · 	citalopram 20 mg oral tablet: Last Dose Taken:  , 1 tab(s) orally once a day  · 	Co Q-10: Last Dose Taken:  , 200  orally once a day  · 	multivitamin: Last Dose Taken:  , 1   once a day  · 	losartan-hydrochlorothiazide 100mg-25mg oral tablet: Last Dose Taken:  , orally once a day  · 	tamsulosin 0.4 mg oral capsule: 1 cap(s) orally once a day  · 	biotin 5000 mcg oral tablet, disintegrating: Last Dose Taken:  , 1 tab(s) orally once a day  · 	omega-3 polyunsaturated fatty acids 1200 mg oral capsule: Last Dose Taken:  ,  orally     PMH/PSH/FH/SH:    Past Medical History:  Deep vein thrombosis  right leg s/p right knee replacement 2007 treated with coumadin x 6 months  Depression    Gastroesophageal reflux disease    Hypertension    Osteoarthritis.     Past Surgical History:  H/O total knee replacement, bilateral  left knee replacemt 2001, revision left knee replacement using cement 2010 ? h/o staph and strep infection   right knee replacement 2001 , revision right knee replacement 2002?, multiple right  knee surgeries done due to staph infection  H/O vasectomy    S/P cataract surgery  bilateral 2017  S/P hernia repair  2017.     Family History:  Family history of lung cancer     Mother  Still living? No  Family history of brain tumor, Age at diagnosis: Age Unknown.     Social History:  · Marital Status		  · Occupation	retired	  · Lives With	spouse	      Review of Systems:   · General	negative	  · Skin/Breast	negative	  · Ophthalmologic	negative	  · ENMT	negative	  · Respiratory and Thorax	negative	  · Gastrointestinal Comments	GERD	  · Genitourinary	negative	  · Musculoskeletal Symptoms	arthritis; joint swelling; joint pain	  · Musculoskeletal Comments	left knee pain	  · Neurological	negative	  · Psychiatric Symptoms	depression; anxiety	  · Hematology/Lymphatics	negative	  · Endocrine	negative	        · Constitutional	Well-developed, well nourished	  · Eyes	EOMI; PERRL; no drainage or redness	  · ENMT	No oral lesions; no gross abnormalities	  · Neck	No bruits; no thyromegaly or nodules	  · Breasts	not examined	  · Back	No deformity or limitation of movement	  · Respiratory	detailed exam	  · Respiratory Details	breath sounds equal; good air movement; clear to auscultation bilaterally; no rales; no rhonchi; no wheezes	  · Cardiovascular	detailed exam	  · Cardiovascular Details	regular rate and rhythm	  · Cardiovascular Details	positive S1; positive S2	  · Gastrointestinal	detailed exam	  · GI Normal	normal; soft; nontender	  · Genitourinary	not examined	  · Rectal	not examined	  · Extremities	detailed exam	  · Extremities Details	normal; no clubbing; no cyanosis	  · Vascular	detailed exam	  · Radial Pulse	right normal; left normal	  · DP Pulse	right normal; left normal	  · Neurological	Alert & oriented; no sensory, motor or coordination deficits, normal reflexes	  · Skin	No lesions; no rash	  · Lymph Nodes	not examined	  · Musculoskeletal	detailed exam	  · Musculoskeletal Details	decreased ROM due to pain; joint swelling; joint erythema; joint warmth

## 2018-01-19 ENCOUNTER — TRANSCRIPTION ENCOUNTER (OUTPATIENT)
Age: 78
End: 2018-01-19

## 2018-01-19 LAB
ANION GAP SERPL CALC-SCNC: 10 MMOL/L — SIGNIFICANT CHANGE UP (ref 5–17)
BUN SERPL-MCNC: 23 MG/DL — SIGNIFICANT CHANGE UP (ref 7–23)
CALCIUM SERPL-MCNC: 8.5 MG/DL — SIGNIFICANT CHANGE UP (ref 8.4–10.5)
CHLORIDE SERPL-SCNC: 103 MMOL/L — SIGNIFICANT CHANGE UP (ref 96–108)
CO2 SERPL-SCNC: 27 MMOL/L — SIGNIFICANT CHANGE UP (ref 22–31)
CREAT SERPL-MCNC: 0.97 MG/DL — SIGNIFICANT CHANGE UP (ref 0.5–1.3)
FLUID SEGMENTED GRANULOCYTES: 21 % — SIGNIFICANT CHANGE UP
GLUCOSE SERPL-MCNC: 131 MG/DL — HIGH (ref 70–99)
HCT VFR BLD CALC: 37.8 % — LOW (ref 39–50)
HGB BLD-MCNC: 12.6 G/DL — LOW (ref 13–17)
LYMPHOCYTES # FLD: 27 % — SIGNIFICANT CHANGE UP
MCHC RBC-ENTMCNC: 30.4 PG — SIGNIFICANT CHANGE UP (ref 27–34)
MCHC RBC-ENTMCNC: 33.3 GM/DL — SIGNIFICANT CHANGE UP (ref 32–36)
MCV RBC AUTO: 91.4 FL — SIGNIFICANT CHANGE UP (ref 80–100)
MONOS+MACROS # FLD: 52 % — SIGNIFICANT CHANGE UP
PLATELET # BLD AUTO: 312 K/UL — SIGNIFICANT CHANGE UP (ref 150–400)
POTASSIUM SERPL-MCNC: 3.9 MMOL/L — SIGNIFICANT CHANGE UP (ref 3.5–5.3)
POTASSIUM SERPL-SCNC: 3.9 MMOL/L — SIGNIFICANT CHANGE UP (ref 3.5–5.3)
RBC # BLD: 4.14 M/UL — LOW (ref 4.2–5.8)
RBC # FLD: 11.6 % — SIGNIFICANT CHANGE UP (ref 10.3–14.5)
SODIUM SERPL-SCNC: 140 MMOL/L — SIGNIFICANT CHANGE UP (ref 135–145)
WBC # BLD: 15 K/UL — HIGH (ref 3.8–10.5)
WBC # FLD AUTO: 15 K/UL — HIGH (ref 3.8–10.5)

## 2018-01-19 PROCEDURE — 99233 SBSQ HOSP IP/OBS HIGH 50: CPT

## 2018-01-19 RX ORDER — DOCUSATE SODIUM 100 MG
1 CAPSULE ORAL
Qty: 0 | Refills: 0 | DISCHARGE
Start: 2018-01-19

## 2018-01-19 RX ORDER — ACETAMINOPHEN 500 MG
1000 TABLET ORAL ONCE
Qty: 0 | Refills: 0 | Status: COMPLETED | OUTPATIENT
Start: 2018-01-19 | End: 2018-01-19

## 2018-01-19 RX ORDER — APIXABAN 2.5 MG/1
2.5 TABLET, FILM COATED ORAL EVERY 12 HOURS
Qty: 0 | Refills: 0 | Status: DISCONTINUED | OUTPATIENT
Start: 2018-01-19 | End: 2018-01-20

## 2018-01-19 RX ORDER — ASPIRIN/CALCIUM CARB/MAGNESIUM 324 MG
2 TABLET ORAL
Qty: 112 | Refills: 0
Start: 2018-01-19 | End: 2018-02-15

## 2018-01-19 RX ORDER — APIXABAN 2.5 MG/1
1 TABLET, FILM COATED ORAL
Qty: 22 | Refills: 0
Start: 2018-01-19 | End: 2018-01-29

## 2018-01-19 RX ORDER — UBIDECARENONE 100 MG
200 CAPSULE ORAL
Qty: 0 | Refills: 0 | COMMUNITY

## 2018-01-19 RX ORDER — CELECOXIB 200 MG/1
1 CAPSULE ORAL
Qty: 40 | Refills: 1
Start: 2018-01-19 | End: 2018-02-27

## 2018-01-19 RX ORDER — CELECOXIB 200 MG/1
200 CAPSULE ORAL
Qty: 0 | Refills: 0 | Status: DISCONTINUED | OUTPATIENT
Start: 2018-01-19 | End: 2018-01-20

## 2018-01-19 RX ORDER — ACETAMINOPHEN 500 MG
1000 TABLET ORAL EVERY 8 HOURS
Qty: 0 | Refills: 0 | Status: DISCONTINUED | OUTPATIENT
Start: 2018-01-19 | End: 2018-01-20

## 2018-01-19 RX ORDER — CINNAMON BARK 500 MG
0 CAPSULE ORAL
Qty: 0 | Refills: 0 | COMMUNITY

## 2018-01-19 RX ORDER — OMEGA-3 ACID ETHYL ESTERS 1 G
0 CAPSULE ORAL
Qty: 0 | Refills: 0 | COMMUNITY

## 2018-01-19 RX ORDER — SENNA PLUS 8.6 MG/1
2 TABLET ORAL
Qty: 0 | Refills: 0 | DISCHARGE
Start: 2018-01-19

## 2018-01-19 RX ORDER — ACETAMINOPHEN 500 MG
2 TABLET ORAL
Qty: 0 | Refills: 0 | DISCHARGE
Start: 2018-01-19 | End: 2018-02-01

## 2018-01-19 RX ORDER — POLYETHYLENE GLYCOL 3350 17 G/17G
17 POWDER, FOR SOLUTION ORAL
Qty: 0 | Refills: 0 | DISCHARGE
Start: 2018-01-19

## 2018-01-19 RX ADMIN — Medication 100 MILLIGRAM(S): at 05:23

## 2018-01-19 RX ADMIN — SENNA PLUS 2 TABLET(S): 8.6 TABLET ORAL at 21:20

## 2018-01-19 RX ADMIN — Medication 400 MILLIGRAM(S): at 12:47

## 2018-01-19 RX ADMIN — OXYCODONE HYDROCHLORIDE 10 MILLIGRAM(S): 5 TABLET ORAL at 10:17

## 2018-01-19 RX ADMIN — Medication 100 MILLIGRAM(S): at 21:20

## 2018-01-19 RX ADMIN — OXYCODONE HYDROCHLORIDE 10 MILLIGRAM(S): 5 TABLET ORAL at 06:50

## 2018-01-19 RX ADMIN — TAMSULOSIN HYDROCHLORIDE 0.4 MILLIGRAM(S): 0.4 CAPSULE ORAL at 21:20

## 2018-01-19 RX ADMIN — OXYCODONE HYDROCHLORIDE 10 MILLIGRAM(S): 5 TABLET ORAL at 09:34

## 2018-01-19 RX ADMIN — CELECOXIB 200 MILLIGRAM(S): 200 CAPSULE ORAL at 17:29

## 2018-01-19 RX ADMIN — Medication 1000 UNIT(S): at 12:48

## 2018-01-19 RX ADMIN — Medication 1000 MILLIGRAM(S): at 17:29

## 2018-01-19 RX ADMIN — CELECOXIB 200 MILLIGRAM(S): 200 CAPSULE ORAL at 17:40

## 2018-01-19 RX ADMIN — Medication 400 MILLIGRAM(S): at 06:18

## 2018-01-19 RX ADMIN — OXYCODONE HYDROCHLORIDE 10 MILLIGRAM(S): 5 TABLET ORAL at 04:05

## 2018-01-19 RX ADMIN — Medication 1000 MILLIGRAM(S): at 13:33

## 2018-01-19 RX ADMIN — OXYCODONE HYDROCHLORIDE 10 MILLIGRAM(S): 5 TABLET ORAL at 06:17

## 2018-01-19 RX ADMIN — Medication 162 MILLIGRAM(S): at 08:41

## 2018-01-19 RX ADMIN — CITALOPRAM 20 MILLIGRAM(S): 10 TABLET, FILM COATED ORAL at 12:48

## 2018-01-19 RX ADMIN — CELECOXIB 200 MILLIGRAM(S): 200 CAPSULE ORAL at 08:39

## 2018-01-19 RX ADMIN — Medication 1 TABLET(S): at 12:48

## 2018-01-19 RX ADMIN — PANTOPRAZOLE SODIUM 40 MILLIGRAM(S): 20 TABLET, DELAYED RELEASE ORAL at 05:23

## 2018-01-19 RX ADMIN — Medication 100 MILLIGRAM(S): at 14:37

## 2018-01-19 RX ADMIN — CELECOXIB 200 MILLIGRAM(S): 200 CAPSULE ORAL at 09:20

## 2018-01-19 RX ADMIN — SODIUM CHLORIDE 100 MILLILITER(S): 9 INJECTION, SOLUTION INTRAVENOUS at 05:23

## 2018-01-19 RX ADMIN — APIXABAN 2.5 MILLIGRAM(S): 2.5 TABLET, FILM COATED ORAL at 21:20

## 2018-01-19 RX ADMIN — OXYCODONE HYDROCHLORIDE 10 MILLIGRAM(S): 5 TABLET ORAL at 03:32

## 2018-01-19 RX ADMIN — OXYCODONE HYDROCHLORIDE 10 MILLIGRAM(S): 5 TABLET ORAL at 19:30

## 2018-01-19 RX ADMIN — Medication 1000 MILLIGRAM(S): at 17:40

## 2018-01-19 RX ADMIN — OXYCODONE HYDROCHLORIDE 10 MILLIGRAM(S): 5 TABLET ORAL at 19:00

## 2018-01-19 NOTE — PHYSICAL THERAPY INITIAL EVALUATION ADULT - RANGE OF MOTION EXAMINATION, REHAB EVAL
bilateral upper extremity ROM was WNL (within normal limits)/Right LE ROM was WFL (within functional limits)

## 2018-01-19 NOTE — DISCHARGE NOTE ADULT - CARE PROVIDER_API CALL
Katelyn Segovia), Orthopedics  [address] [place] [address]  Phone: [phone]  Fax: [phone]

## 2018-01-19 NOTE — PHYSICAL THERAPY INITIAL EVALUATION ADULT - GAIT TRAINING, PT EVAL
amb. 150 feet independently w/ RW in 2-3 days. Negotiate 6 steps of stair independently w/  in 2-3 days amb. 150 feet independently w/ RW in 2-3 days. Negotiate 6 steps of stair independently w/ SAC in 2-3 days

## 2018-01-19 NOTE — DISCHARGE NOTE ADULT - INSTRUCTIONS
none  For Constipation :   • Increase your water intake. Drink at least 8 glasses of water daily.  • Try adding fiber to your diet by eating fruits, vegetables and foods that are rich in grains.  • If you do experience constipation, you may take an over-the-counter stool softener/laxative such as Mora Colace, Senekot or  Milk of Magnesia. keep incision clean and dry

## 2018-01-19 NOTE — PROGRESS NOTE ADULT - SUBJECTIVE AND OBJECTIVE BOX
ORTHOPEDIC [place] PROGRESS NOTE  JANN BARROW      77y Male                                                                                                                               POD #1        STATUS POST:               Pre-Op Dx: Failure of total knee replacement, initial encounter    Post-Op Dx:  Failure of total knee replacement, initial encounter    Procedure: Revision total knee arthroplasty                                              Patient comfortable pain controlled tolerating p.o diet  Pain (0-10):   Current Pain Management:  [ ] PCA   [x ] Po Analgesics [x ] IM /IV Anagesics     T(F): 98.3  HR: 71  BP: 107/62  RR: 17  SpO2: 94%                        12.6   15.0  )-----------( 312      ( 19 Jan 2018 07:25 )             37.8                     01-19    140  |  103  |  23  ----------------------------<  131<H>  3.9   |  27  |  0.97    Ca    8.5      19 Jan 2018 07:25      Physical Exam :    -   Surgical site C/D/I.   -   Distal Neurvascular status intact grossly.   -   Warm well perfused; capillary refill <3 seconds   -   (+)EHL/FHL 5/5 dorsi plantar flexion 5/5  -   (+) Sensation to light touch  -   (-) Calf tenderness Bilaterally    A/P: 77y Male s/p Failure of total knee replacement, initial encounter    -   Ortho Stable  -   Pain control   -   Medicine to follow  -   DVT ppx:     [ x]SCDs     [x ] ASA     [ ] Eliquis     [ ] Lovenox  -   Weight bearing status:  WBAT [ x]        PWB    [ ]     TTWB  [ ]      NWB  [ ]   -  Dispo:     Home [x ]     Acute Rehab [ ]     RADHA [ ]     TBD [ ]

## 2018-01-19 NOTE — DISCHARGE NOTE ADULT - NS AS ACTIVITY OBS
Walking-Indoors allowed/Do not drive or operate machinery/No Heavy lifting/straining/Stairs allowed No Heavy lifting/straining/Showering allowed/Walking-Indoors allowed/Do not make important decisions/Do not drive or operate machinery/Stairs allowed

## 2018-01-19 NOTE — PHYSICAL THERAPY INITIAL EVALUATION ADULT - ADDITIONAL COMMENTS
Pt lives in 3 level house w/ 4 RON, and 6 steps to bed and bathroom upstair. pt lives w/ his spouse and doesn't own any DME. Pt lives in 3 level house w/ 4 RON w/ bilateral HR, and 6 steps to bed and bathroom upstairs. pt lives w/ his spouse and doesn't own any DME.

## 2018-01-19 NOTE — PROGRESS NOTE ADULT - SUBJECTIVE AND OBJECTIVE BOX
Patient is a 77y old  Male who presents with a chief complaint of Left knee pain and swelling (17 Jan 2018 16:23)        HPI:      SUBJECTIVE & OBJECTIVE: Pt seen and examined at bedside.     PHYSICAL EXAM:  T(C): 36.8 (01-19-18 @ 07:40), Max: 37 (01-18-18 @ 20:15)  HR: 71 (01-19-18 @ 07:40) (69 - 82)  BP: 107/62 (01-19-18 @ 07:40) (94/60 - 142/83)  RR: 17 (01-19-18 @ 07:40) (12 - 19)  SpO2: 94% (01-19-18 @ 07:40) (92% - 100%)  Wt(kg): -- Height (cm): 182.88 (01-18 @ 11:02)  Weight (kg): 110 (01-18 @ 11:02)  BMI (kg/m2): 32.9 (01-18 @ 11:02)  BSA (m2): 2.31 (01-18 @ 11:02)  GENERAL: NAD, well-groomed, well-developed  HEAD:  Atraumatic, Normocephalic  EYES: EOMI, PERRLA, conjunctiva and sclera clear  ENMT: Moist mucous membranes  NECK: Supple, No JVD  NERVOUS SYSTEM:  Alert & Oriented X3, Motor Strength 5/5 B/L upper and lower extremities; DTRs 2+ intact and symmetric  CHEST/LUNG: Clear to auscultation bilaterally; No rales, rhonchi, wheezing, or rubs  HEART: Regular rate and rhythm; No murmurs, rubs, or gallops  ABDOMEN: Soft, Nontender, Nondistended; Bowel sounds present  EXTREMITIES:  2+ Peripheral Pulses, No clubbing, cyanosis, or edema        MEDICATIONS  (STANDING):  acetaminophen   Tablet. 1000 milliGRAM(s) Oral every 8 hours  acetaminophen  IVPB. 1000 milliGRAM(s) IV Intermittent once  aspirin enteric coated 162 milliGRAM(s) Oral every 12 hours  celecoxib 200 milliGRAM(s) Oral two times a day with meals  cholecalciferol 1000 Unit(s) Oral daily  citalopram 20 milliGRAM(s) Oral daily  docusate sodium 100 milliGRAM(s) Oral three times a day  lactated ringers. 1000 milliLiter(s) (100 mL/Hr) IV Continuous <Continuous>  multivitamin 1 Tablet(s) Oral daily  ondansetron Injectable 4 milliGRAM(s) IV Push once  pantoprazole    Tablet 40 milliGRAM(s) Oral before breakfast  senna 2 Tablet(s) Oral at bedtime  tamsulosin 0.4 milliGRAM(s) Oral at bedtime    MEDICATIONS  (PRN):  aluminum hydroxide/magnesium hydroxide/simethicone Suspension 30 milliLiter(s) Oral four times a day PRN Indigestion  magnesium hydroxide Suspension 30 milliLiter(s) Oral daily PRN Constipation  ondansetron Injectable 4 milliGRAM(s) IV Push every 6 hours PRN Nausea and/or Vomiting  oxyCODONE    IR 5 milliGRAM(s) Oral every 3 hours PRN Mild Pain (1 - 3)  oxyCODONE    IR 10 milliGRAM(s) Oral every 3 hours PRN Moderate Pain (4 - 6)  polyethylene glycol 3350 17 Gram(s) Oral daily PRN Constipation      LABS:                        12.6   15.0  )-----------( 312      ( 19 Jan 2018 07:25 )             37.8     01-19    140  |  103  |  23  ----------------------------<  131<H>  3.9   |  27  |  0.97    Ca    8.5      19 Jan 2018 07:25            CAPILLARY BLOOD GLUCOSE          CAPILLARY BLOOD GLUCOSE        CAPILLARY BLOOD GLUCOSE                RECENT CULTURES:      RADIOLOGY & ADDITIONAL TESTS:                        DVT/GI ppx  Discussed with pt @ bedside

## 2018-01-19 NOTE — DISCHARGE NOTE ADULT - PLAN OF CARE
Improve ambulation, ADLs and quality of life Physical Therapy/Occupational Therapy for: ambulation, transfers, stairs, ADL's (activities of daily living), range of motion exercises, and isometrics  -Activity  • Weight Bearing as tolerated with rolling walker.  • Take short, frequent walks increasing the distance that you walk each day as tolerated.  • Change your position every hour to decrease pain and stiffness.  • Continue the exercises taught to you by your physical therapist.  • No driving until cleared by the doctor.  • No tub baths, hot tubs, or swimming pools until instructed by your doctor.  • Do not squat down on the floor.  • Do not kneel or twist your knee.  • Range of Motion Goals: Flexion= 120 degrees, Extension = 0 degrees  Keep incision clean and dry. May shower 5 days after surgery if no drainage from incision.  Prineo tape removal 2 weeks after surgery at Surgeon's office.  - Call your doctor if you experience:  • An increase in pain not controlled by pain medication or change in activity or position.  • Temperature greater than 101° F.  • Redness, increased swelling or foul smelling drainage from or around the incision.  • Numbness, tingling or a change in color or temperature of the operative leg.  • Call your doctor immediately if you experience chest pain, shortness of breath or calf pain.

## 2018-01-19 NOTE — DISCHARGE NOTE ADULT - DURABLE MEDICAL EQUIPMENT AGENCY
Rolling walker and Commode from [employer] includes the [facility] [phone]

## 2018-01-19 NOTE — PROGRESS NOTE ADULT - SUBJECTIVE AND OBJECTIVE BOX
Medication Kody Information   Neo Senior [id number]			Total Knee  6[birth year]		  Indication (What it's used for)	START  	STOP 	Medication 	Times	  Prevent Blood Clots		Apixaban (Eliquis®) 2.5mg 2xDay for 12 days 	6am	  *** It is very Important to finish all doses of Eliquis to prevent clots. Any issues, call your surgeon.	6pm	  		Aspirin EC 162mG 2xDay       FOR 4 WEEKS  after  Apixaban 	6am	  				6pm	  Protect your Stomach 		Pantoprazole 40mg daily 	6am	  Help prevent pain 		For 2 to 3 wks. May take longer if needed	Celecoxib 200mg 2xDay	10am	  			(Take at least 2 hrs after aspirin)	10pm 	  			Acetaminophen 1000mg 2xday 	6am	  			(Extra Strength 500mg tabs -( take 2 tabs for 1000mg dose) 	6pm	  					  For Breakthrough Pain 			Narcotic Pain Medication 		  take as needed 			(oxycodone, hydromorphone, or tramadol)	  **Times left blank so you can note dose and time taken on calendar		  					  Stool Softener 			Docusate 100mg 3xDay	6am	  take as needed while taking narcotic and until bowels have returned to normal	2pm	  				10pm 	  Bloating/Gas if needed 	as directed	Simethicone 80 mg 		  To treat Constipation IF needed 	as directed	Senna,Bisacodyl,or Miralax	10pm

## 2018-01-19 NOTE — DISCHARGE NOTE ADULT - CARE PLAN
Principal Discharge DX:	Total knee replacement status, left  Goal:	Improve ambulation, ADLs and quality of life  Assessment and plan of treatment:	Physical Therapy/Occupational Therapy for: ambulation, transfers, stairs, ADL's (activities of daily living), range of motion exercises, and isometrics  -Activity  • Weight Bearing as tolerated with rolling walker.  • Take short, frequent walks increasing the distance that you walk each day as tolerated.  • Change your position every hour to decrease pain and stiffness.  • Continue the exercises taught to you by your physical therapist.  • No driving until cleared by the doctor.  • No tub baths, hot tubs, or swimming pools until instructed by your doctor.  • Do not squat down on the floor.  • Do not kneel or twist your knee.  • Range of Motion Goals: Flexion= 120 degrees, Extension = 0 degrees  Keep incision clean and dry. May shower 5 days after surgery if no drainage from incision.  Prineo tape removal 2 weeks after surgery at Surgeon's office.  - Call your doctor if you experience:  • An increase in pain not controlled by pain medication or change in activity or position.  • Temperature greater than 101° F.  • Redness, increased swelling or foul smelling drainage from or around the incision.  • Numbness, tingling or a change in color or temperature of the operative leg.  • Call your doctor immediately if you experience chest pain, shortness of breath or calf pain.

## 2018-01-19 NOTE — DISCHARGE NOTE ADULT - MEDICATION SUMMARY - MEDICATIONS TO TAKE
I will START or STAY ON the medications listed below when I get home from the hospital:    rolling walker  -- Indication: For equipement    3 in 1 commode  -- Indication: For equipement    acetaminophen 500 mg oral tablet  -- 2 tab(s) by mouth every 12 hours  -- Indication: For Pain    celecoxib 200 mg oral capsule  -- 1 cap(s) by mouth 2 times a day (with meals)  -- Indication: For Pain and swelling    Aspirin Enteric Coated 81 mg oral delayed release tablet  -- 2 tab(s) by mouth 2 times a day for 28 days. Start once Eliquis is completed.  -- Swallow whole.  Do not crush.  Take with food or milk.    -- Indication: For Prevent blood clots    oxyCODONE 5 mg oral tablet  -- 1-2 tab(s) by mouth every 4 hours - MDD:8 tablets  -- Caution federal law prohibits the transfer of this drug to any person other  than the person for whom it was prescribed.  It is very important that you take or use this exactly as directed.  Do not skip doses or discontinue unless directed by your doctor.  May cause drowsiness.  Alcohol may intensify this effect.  Use care when operating dangerous machinery.  This prescription cannot be refilled.  Using more of this medication than prescribed may cause serious breathing problems.    -- Indication: For Pain    tamsulosin 0.4 mg oral capsule  -- 1 cap(s) by mouth once a day  -- Indication: For Heart    apixaban 2.5 mg oral tablet  -- 1 tab(s) by mouth every 12 hours; start Aspirin 162 mg twice daily for 28 days once Eliquis is completed  -- Indication: For Prevent blood clots    citalopram 20 mg oral tablet  -- 1 tab(s) by mouth once a day  -- Indication: For Psych    losartan-hydrochlorothiazide 100mg-25mg oral tablet  -- orally once a day  -- Indication: For Htn    senna oral tablet  -- 2 tab(s) by mouth once a day (at bedtime)  -- Indication: For Constipation    docusate sodium 100 mg oral capsule  -- 1 cap(s) by mouth 3 times a day  -- Indication: For Constipation    polyethylene glycol 3350 oral powder for reconstitution  -- 17 gram(s) by mouth once a day, As needed, Constipation  -- Indication: For Constipation    pantoprazole 40 mg oral delayed release tablet  -- orally once a day  -- Indication: For reflux    multivitamin  -- 1   once a day  -- Indication: For supp    biotin 5000 mcg oral tablet, disintegrating  -- 1 tab(s) by mouth once a day  -- Indication: For supp    Vitamin D3 1000 intl units oral capsule  -- orally once a day  -- Indication: For supp

## 2018-01-19 NOTE — DISCHARGE NOTE ADULT - PATIENT PORTAL LINK FT
“You can access the FollowHealth Patient Portal, offered by [facility], by registering with the following website: [email or web address]”

## 2018-01-19 NOTE — DISCHARGE NOTE ADULT - HOSPITAL COURSE
This patient was admitted to [facility] with a history of Revision Left TKR with infectious processes and loosening of components.  Patient went to Pre-Surgical Testing at [facility] and was medically cleared to undergo elective procedure. Patient underwent Full Revision Left TKR by Dr. Katelyn Segovia on 1/18/18. Procedure was well tolerated.  No operative or sintia-operative complications arose during patients hospital course.  Patient received antibiotic according to SCIP guidelines for infection prevention.  Eliquis was given for DVT prophylaxis.  Anesthesia, Medical Hospitalist, Physical Therapy and Occupational Therapy were consulted. Patient is stable for discharge with a good prognosis.  Appropriate discharge instructions and medications are provided in this document.

## 2018-01-20 ENCOUNTER — TRANSCRIPTION ENCOUNTER (OUTPATIENT)
Age: 78
End: 2018-01-20

## 2018-01-20 VITALS
DIASTOLIC BLOOD PRESSURE: 66 MMHG | TEMPERATURE: 98 F | HEART RATE: 74 BPM | SYSTOLIC BLOOD PRESSURE: 117 MMHG | OXYGEN SATURATION: 94 % | RESPIRATION RATE: 17 BRPM

## 2018-01-20 LAB
ANION GAP SERPL CALC-SCNC: 9 MMOL/L — SIGNIFICANT CHANGE UP (ref 5–17)
BUN SERPL-MCNC: 20 MG/DL — SIGNIFICANT CHANGE UP (ref 7–23)
CALCIUM SERPL-MCNC: 8.5 MG/DL — SIGNIFICANT CHANGE UP (ref 8.4–10.5)
CHLORIDE SERPL-SCNC: 103 MMOL/L — SIGNIFICANT CHANGE UP (ref 96–108)
CO2 SERPL-SCNC: 26 MMOL/L — SIGNIFICANT CHANGE UP (ref 22–31)
CREAT SERPL-MCNC: 1.07 MG/DL — SIGNIFICANT CHANGE UP (ref 0.5–1.3)
GLUCOSE SERPL-MCNC: 97 MG/DL — SIGNIFICANT CHANGE UP (ref 70–99)
HCT VFR BLD CALC: 32.3 % — LOW (ref 39–50)
HGB BLD-MCNC: 11.4 G/DL — LOW (ref 13–17)
MCHC RBC-ENTMCNC: 32.1 PG — SIGNIFICANT CHANGE UP (ref 27–34)
MCHC RBC-ENTMCNC: 35.4 GM/DL — SIGNIFICANT CHANGE UP (ref 32–36)
MCV RBC AUTO: 90.7 FL — SIGNIFICANT CHANGE UP (ref 80–100)
PLATELET # BLD AUTO: 231 K/UL — SIGNIFICANT CHANGE UP (ref 150–400)
POTASSIUM SERPL-MCNC: 3.8 MMOL/L — SIGNIFICANT CHANGE UP (ref 3.5–5.3)
POTASSIUM SERPL-SCNC: 3.8 MMOL/L — SIGNIFICANT CHANGE UP (ref 3.5–5.3)
RBC # BLD: 3.57 M/UL — LOW (ref 4.2–5.8)
RBC # FLD: 11.3 % — SIGNIFICANT CHANGE UP (ref 10.3–14.5)
SODIUM SERPL-SCNC: 138 MMOL/L — SIGNIFICANT CHANGE UP (ref 135–145)
WBC # BLD: 8.6 K/UL — SIGNIFICANT CHANGE UP (ref 3.8–10.5)
WBC # FLD AUTO: 8.6 K/UL — SIGNIFICANT CHANGE UP (ref 3.8–10.5)

## 2018-01-20 PROCEDURE — 73562 X-RAY EXAM OF KNEE 3: CPT

## 2018-01-20 PROCEDURE — C1776: CPT

## 2018-01-20 PROCEDURE — 88302 TISSUE EXAM BY PATHOLOGIST: CPT

## 2018-01-20 PROCEDURE — 97165 OT EVAL LOW COMPLEX 30 MIN: CPT

## 2018-01-20 PROCEDURE — 99232 SBSQ HOSP IP/OBS MODERATE 35: CPT

## 2018-01-20 PROCEDURE — 87070 CULTURE OTHR SPECIMN AEROBIC: CPT

## 2018-01-20 PROCEDURE — C1713: CPT

## 2018-01-20 PROCEDURE — 88311 DECALCIFY TISSUE: CPT

## 2018-01-20 PROCEDURE — 88300 SURGICAL PATH GROSS: CPT

## 2018-01-20 PROCEDURE — 97535 SELF CARE MNGMENT TRAINING: CPT

## 2018-01-20 PROCEDURE — 87205 SMEAR GRAM STAIN: CPT

## 2018-01-20 PROCEDURE — 85027 COMPLETE CBC AUTOMATED: CPT

## 2018-01-20 PROCEDURE — C1889: CPT

## 2018-01-20 PROCEDURE — 97110 THERAPEUTIC EXERCISES: CPT

## 2018-01-20 PROCEDURE — 89051 BODY FLUID CELL COUNT: CPT

## 2018-01-20 PROCEDURE — 80048 BASIC METABOLIC PNL TOTAL CA: CPT

## 2018-01-20 PROCEDURE — 87075 CULTR BACTERIA EXCEPT BLOOD: CPT

## 2018-01-20 PROCEDURE — 88305 TISSUE EXAM BY PATHOLOGIST: CPT

## 2018-01-20 PROCEDURE — 87186 SC STD MICRODIL/AGAR DIL: CPT

## 2018-01-20 PROCEDURE — 97116 GAIT TRAINING THERAPY: CPT

## 2018-01-20 RX ORDER — OXYCODONE HYDROCHLORIDE 5 MG/1
1 TABLET ORAL
Qty: 80 | Refills: 0
Start: 2018-01-20

## 2018-01-20 RX ORDER — CELECOXIB 200 MG/1
0 CAPSULE ORAL
Qty: 0 | Refills: 0 | COMMUNITY

## 2018-01-20 RX ORDER — ASPIRIN/CALCIUM CARB/MAGNESIUM 324 MG
1 TABLET ORAL
Qty: 0 | Refills: 0 | COMMUNITY

## 2018-01-20 RX ADMIN — Medication 100 MILLIGRAM(S): at 06:04

## 2018-01-20 RX ADMIN — Medication 1 TABLET(S): at 12:19

## 2018-01-20 RX ADMIN — Medication 1000 UNIT(S): at 12:18

## 2018-01-20 RX ADMIN — OXYCODONE HYDROCHLORIDE 10 MILLIGRAM(S): 5 TABLET ORAL at 05:30

## 2018-01-20 RX ADMIN — OXYCODONE HYDROCHLORIDE 10 MILLIGRAM(S): 5 TABLET ORAL at 09:30

## 2018-01-20 RX ADMIN — OXYCODONE HYDROCHLORIDE 10 MILLIGRAM(S): 5 TABLET ORAL at 06:00

## 2018-01-20 RX ADMIN — OXYCODONE HYDROCHLORIDE 10 MILLIGRAM(S): 5 TABLET ORAL at 08:29

## 2018-01-20 RX ADMIN — OXYCODONE HYDROCHLORIDE 10 MILLIGRAM(S): 5 TABLET ORAL at 12:20

## 2018-01-20 RX ADMIN — Medication 1000 MILLIGRAM(S): at 10:51

## 2018-01-20 RX ADMIN — Medication 1000 MILLIGRAM(S): at 11:00

## 2018-01-20 RX ADMIN — OXYCODONE HYDROCHLORIDE 10 MILLIGRAM(S): 5 TABLET ORAL at 13:00

## 2018-01-20 RX ADMIN — PANTOPRAZOLE SODIUM 40 MILLIGRAM(S): 20 TABLET, DELAYED RELEASE ORAL at 06:04

## 2018-01-20 RX ADMIN — CELECOXIB 200 MILLIGRAM(S): 200 CAPSULE ORAL at 08:25

## 2018-01-20 RX ADMIN — CITALOPRAM 20 MILLIGRAM(S): 10 TABLET, FILM COATED ORAL at 12:18

## 2018-01-20 RX ADMIN — APIXABAN 2.5 MILLIGRAM(S): 2.5 TABLET, FILM COATED ORAL at 08:25

## 2018-01-20 NOTE — PROGRESS NOTE ADULT - PROBLEM SELECTOR PLAN 1
s/p left revision of left knee.  Continue with pain management, DVT proph, and wound care as per Ortho.  PT/OT
s/p left revision of left knee day 1  pt/ot  pain control   encourage ambulation, ncentive spirometer  DVT prophalxis

## 2018-01-20 NOTE — PROGRESS NOTE ADULT - ASSESSMENT
76 y/o male with pmh of htn, hld, bph, gerd, s/p revision of left knee
76 y/o male with pmh of htn, hld, bph, gerd, s/p revision of left knee day 1, no acute complaints

## 2018-01-20 NOTE — PROGRESS NOTE ADULT - SUBJECTIVE AND OBJECTIVE BOX
JANN BARROW                                                                [id number]                                                     Allergies---codeine (Nausea)  penicillin (Hives)  vancomycin (Nausea; Urticaria)        Pt is a 77y year old Male s/p revision left TKR.   Pt. is A&O x 3, resting comfortably, with no complaints.   Pain is 2/10.   Tolerating the diet.   Denies chest pain / shortness of breath / dyspnea / nausea / vomiting / headaches or light headed ness.       Vital Signs Last 24 Hrs  T(F): 97.7 (01-20-18 @ 07:12), Max: 98.4 (01-19-18 @ 23:30)  HR: 74 (01-20-18 @ 07:12)  BP: 117/66 (01-20-18 @ 07:12)  RR: 17 (01-20-18 @ 07:12)  SpO2: 94% (01-20-18 @ 07:12)    I&O's Detail    19 Jan 2018 07:01  -  20 Jan 2018 07:00  --------------------------------------------------------  IN:  Total IN: 0 mL    OUT:    Accordian: 580 mL    Voided: 1700 mL  Total OUT: 2280 mL    Total NET: -2280 mL          PE:   Left Lower Extremity:   Dressing is C/D/I.   Dressing changed.   Incision is clean and dry.   The wound is closed with prineo.   No redness, swelling, heat, discharge or other evidence of infection, superficial or deep.   Neurovascularly intact.   No gross evidence of motor or sensory deficit.   +2 DP/PT pulses.   EHL/FHL/TA intact.   Toes are pink and mobile.   Capillary refill < 2 seconds.   Negative calf tenderness.   PAS on                               11.4   8.6   )--------------( 231                          01-20-18 @ 07:14               32.3       138   |  103  |  20  -----------------------------<  97                  01-20-18 @ 07:14  3.8    |  26    |  1.07    Ca    8.5                A:   Pt is a 77y year old Male S/P revision left total knee replacement, Post Op Day #2        Plan:    - Follow up with Medicine    - OOB with PT/OT   - DVT ppx = PAS +  apixaban 2.5 milliGRAM(s) Oral every 12 hours   - Pain control    - Incentive spirometry   - Labs in A.M.   - Discharge Planning                                                                                                                                                                           Harpal Lovett RPA-C

## 2018-01-20 NOTE — PROGRESS NOTE ADULT - SUBJECTIVE AND OBJECTIVE BOX
CC.  S/P left Total knee replacement  HPI.  Patient reports left knee pain is aching, mild now, controlled with pain medications, and exacerbated with excess movement.  denies any numbness or weakness              Constitutional: No fever, fatigue or weight loss.  Skin: No rash.  Eyes: No recent vision problems or eye pain.  ENT: No congestion, ear pain, or sore throat.  Endocrine: No thyroid problems.  Cardiovascular: No chest pain or palpation.  Respiratory: No cough, shortness of breath, congestion, or wheezing.  Gastrointestinal: No abdominal pain, nausea, vomiting, or diarrhea.  Genitourinary: No dysuria.  Musculoskeletal: No joint swelling.  Neurologic: No headache.      Vital Signs Last 24 Hrs  T(C): 36.5 (01-20-18 @ 07:12), Max: 36.9 (01-19-18 @ 23:30)  T(F): 97.7 (01-20-18 @ 07:12), Max: 98.4 (01-19-18 @ 23:30)  HR: 74 (01-20-18 @ 07:12) (70 - 74)  BP: 117/66 (01-20-18 @ 07:12) (104/62 - 117/66)  BP(mean): --  RR: 17 (01-20-18 @ 07:12) (17 - 18)  SpO2: 94% (01-20-18 @ 07:12) (92% - 96%)        PHYSICAL EXAM-  GENERAL: NAD, well-groomed, well-developed  HEAD:  Atraumatic, Normocephalic  EYES: EOMI, PERRLA, conjunctiva and sclera clear  NECK: Supple, No JVD, Normal thyroid  NERVOUS SYSTEM:  Alert & Oriented X3, Motor Strength 5/5 B/L upper and lower extremities; DTRs 2+ intact and symmetric  CHEST/LUNG: Clear to percussion bilaterally; No rales, rhonchi, wheezing, or rubs  HEART: Regular rate and rhythm; No murmurs, rubs, or gallops  ABDOMEN: Soft, Nontender, Nondistended; Bowel sounds present  EXTREMITIES:  2+ Peripheral Pulses, No clubbing, cyanosis, or edema  SKIN: No rashes or lesions                                  11.4   8.6   )-----------( 231      ( 20 Jan 2018 07:14 )             32.3     01-20    138  |  103  |  20  ----------------------------<  97  3.8   |  26  |  1.07    Ca    8.5      20 Jan 2018 07:14                      MEDICATIONS  (STANDING):  acetaminophen   Tablet. 1000 milliGRAM(s) Oral every 8 hours  apixaban 2.5 milliGRAM(s) Oral every 12 hours  celecoxib 200 milliGRAM(s) Oral two times a day with meals  cholecalciferol 1000 Unit(s) Oral daily  citalopram 20 milliGRAM(s) Oral daily  docusate sodium 100 milliGRAM(s) Oral three times a day  lactated ringers. 1000 milliLiter(s) (100 mL/Hr) IV Continuous <Continuous>  multivitamin 1 Tablet(s) Oral daily  ondansetron Injectable 4 milliGRAM(s) IV Push once  pantoprazole    Tablet 40 milliGRAM(s) Oral before breakfast  senna 2 Tablet(s) Oral at bedtime  tamsulosin 0.4 milliGRAM(s) Oral at bedtime    MEDICATIONS  (PRN):  aluminum hydroxide/magnesium hydroxide/simethicone Suspension 30 milliLiter(s) Oral four times a day PRN Indigestion  bisacodyl Suppository 10 milliGRAM(s) Rectal daily PRN If no bowel movement by postoperative day #2  magnesium hydroxide Suspension 30 milliLiter(s) Oral daily PRN Constipation  ondansetron Injectable 4 milliGRAM(s) IV Push every 6 hours PRN Nausea and/or Vomiting  oxyCODONE    IR 5 milliGRAM(s) Oral every 3 hours PRN Mild Pain (1 - 3)  oxyCODONE    IR 10 milliGRAM(s) Oral every 3 hours PRN Moderate Pain (4 - 6)  polyethylene glycol 3350 17 Gram(s) Oral daily PRN Constipation          RADIOLOGY RESULTS:

## 2018-01-22 LAB
-  AMPICILLIN: SIGNIFICANT CHANGE UP
-  CIPROFLOXACIN: SIGNIFICANT CHANGE UP
-  ERYTHROMYCIN: SIGNIFICANT CHANGE UP
-  TETRACYCLINE: SIGNIFICANT CHANGE UP
-  VANCOMYCIN: SIGNIFICANT CHANGE UP
METHOD TYPE: SIGNIFICANT CHANGE UP

## 2018-01-23 LAB
CULTURE RESULTS: SIGNIFICANT CHANGE UP
ORGANISM # SPEC MICROSCOPIC CNT: SIGNIFICANT CHANGE UP
ORGANISM # SPEC MICROSCOPIC CNT: SIGNIFICANT CHANGE UP
SPECIMEN SOURCE: SIGNIFICANT CHANGE UP

## 2018-02-02 ENCOUNTER — APPOINTMENT (OUTPATIENT)
Dept: ORTHOPEDIC SURGERY | Facility: CLINIC | Age: 78
End: 2018-02-02
Payer: MEDICARE

## 2018-02-02 VITALS
HEART RATE: 97 BPM | DIASTOLIC BLOOD PRESSURE: 79 MMHG | BODY MASS INDEX: 33.59 KG/M2 | SYSTOLIC BLOOD PRESSURE: 132 MMHG | WEIGHT: 248 LBS | HEIGHT: 72 IN

## 2018-02-02 PROCEDURE — 99024 POSTOP FOLLOW-UP VISIT: CPT

## 2018-02-02 PROCEDURE — 73562 X-RAY EXAM OF KNEE 3: CPT | Mod: LT

## 2018-02-02 RX ORDER — ASPIRIN 81 MG/1
81 TABLET ORAL
Qty: 120 | Refills: 0 | Status: ACTIVE | COMMUNITY
Start: 2018-01-19

## 2018-03-16 ENCOUNTER — APPOINTMENT (OUTPATIENT)
Dept: ORTHOPEDIC SURGERY | Facility: CLINIC | Age: 78
End: 2018-03-16
Payer: MEDICARE

## 2018-03-16 VITALS
DIASTOLIC BLOOD PRESSURE: 81 MMHG | HEIGHT: 72 IN | HEART RATE: 74 BPM | SYSTOLIC BLOOD PRESSURE: 125 MMHG | BODY MASS INDEX: 33.59 KG/M2 | WEIGHT: 248 LBS

## 2018-03-16 PROCEDURE — 73562 X-RAY EXAM OF KNEE 3: CPT | Mod: LT

## 2018-03-16 PROCEDURE — 99024 POSTOP FOLLOW-UP VISIT: CPT

## 2018-04-18 NOTE — PATIENT PROFILE ADULT. - TOBACCO USE
Detail Level: Detailed Quality 226: Preventive Care And Screening: Tobacco Use: Screening And Cessation Intervention: Patient screened for tobacco and is a smoker AND received Cessation Counseling Former smoker

## 2018-05-01 ENCOUNTER — CHART COPY (OUTPATIENT)
Age: 78
End: 2018-05-01

## 2018-05-16 PROBLEM — Z47.1 ORTHOPEDIC AFTERCARE FOR JOINT REPLACEMENT: Status: ACTIVE | Noted: 2018-02-02

## 2018-05-18 ENCOUNTER — APPOINTMENT (OUTPATIENT)
Dept: ORTHOPEDIC SURGERY | Facility: CLINIC | Age: 78
End: 2018-05-18

## 2018-05-18 DIAGNOSIS — Z47.1 AFTERCARE FOLLOWING JOINT REPLACEMENT SURGERY: ICD-10-CM

## 2018-06-01 ENCOUNTER — APPOINTMENT (OUTPATIENT)
Dept: ORTHOPEDIC SURGERY | Facility: CLINIC | Age: 78
End: 2018-06-01
Payer: MEDICARE

## 2018-06-01 VITALS
HEIGHT: 72 IN | BODY MASS INDEX: 33.59 KG/M2 | DIASTOLIC BLOOD PRESSURE: 82 MMHG | WEIGHT: 248 LBS | HEART RATE: 87 BPM | SYSTOLIC BLOOD PRESSURE: 140 MMHG

## 2018-06-01 PROCEDURE — 99214 OFFICE O/P EST MOD 30 MIN: CPT

## 2018-06-01 PROCEDURE — 73562 X-RAY EXAM OF KNEE 3: CPT | Mod: LT

## 2018-07-13 ENCOUNTER — EMERGENCY (EMERGENCY)
Facility: HOSPITAL | Age: 78
LOS: 0 days | Discharge: HOME | End: 2018-07-13
Attending: EMERGENCY MEDICINE | Admitting: EMERGENCY MEDICINE
Payer: MEDICARE

## 2018-07-13 VITALS
RESPIRATION RATE: 18 BRPM | OXYGEN SATURATION: 98 % | DIASTOLIC BLOOD PRESSURE: 75 MMHG | TEMPERATURE: 98 F | SYSTOLIC BLOOD PRESSURE: 134 MMHG | HEART RATE: 69 BPM

## 2018-07-13 VITALS — WEIGHT: 240.08 LBS | HEIGHT: 72 IN

## 2018-07-13 DIAGNOSIS — Z98.52 VASECTOMY STATUS: ICD-10-CM

## 2018-07-13 DIAGNOSIS — Z98.49 CATARACT EXTRACTION STATUS, UNSPECIFIED EYE: Chronic | ICD-10-CM

## 2018-07-13 DIAGNOSIS — Z79.01 LONG TERM (CURRENT) USE OF ANTICOAGULANTS: ICD-10-CM

## 2018-07-13 DIAGNOSIS — M19.90 UNSPECIFIED OSTEOARTHRITIS, UNSPECIFIED SITE: ICD-10-CM

## 2018-07-13 DIAGNOSIS — Z79.82 LONG TERM (CURRENT) USE OF ASPIRIN: ICD-10-CM

## 2018-07-13 DIAGNOSIS — Z98.52 VASECTOMY STATUS: Chronic | ICD-10-CM

## 2018-07-13 DIAGNOSIS — Z98.42 CATARACT EXTRACTION STATUS, LEFT EYE: ICD-10-CM

## 2018-07-13 DIAGNOSIS — F32.9 MAJOR DEPRESSIVE DISORDER, SINGLE EPISODE, UNSPECIFIED: ICD-10-CM

## 2018-07-13 DIAGNOSIS — Z86.718 PERSONAL HISTORY OF OTHER VENOUS THROMBOSIS AND EMBOLISM: ICD-10-CM

## 2018-07-13 DIAGNOSIS — Z96.653 PRESENCE OF ARTIFICIAL KNEE JOINT, BILATERAL: ICD-10-CM

## 2018-07-13 DIAGNOSIS — K21.9 GASTRO-ESOPHAGEAL REFLUX DISEASE WITHOUT ESOPHAGITIS: ICD-10-CM

## 2018-07-13 DIAGNOSIS — Z96.653 PRESENCE OF ARTIFICIAL KNEE JOINT, BILATERAL: Chronic | ICD-10-CM

## 2018-07-13 DIAGNOSIS — Z98.890 OTHER SPECIFIED POSTPROCEDURAL STATES: Chronic | ICD-10-CM

## 2018-07-13 DIAGNOSIS — L53.9 ERYTHEMATOUS CONDITION, UNSPECIFIED: ICD-10-CM

## 2018-07-13 DIAGNOSIS — Z79.899 OTHER LONG TERM (CURRENT) DRUG THERAPY: ICD-10-CM

## 2018-07-13 DIAGNOSIS — Z98.41 CATARACT EXTRACTION STATUS, RIGHT EYE: ICD-10-CM

## 2018-07-13 DIAGNOSIS — M79.675 PAIN IN LEFT TOE(S): ICD-10-CM

## 2018-07-13 DIAGNOSIS — L03.032 CELLULITIS OF LEFT TOE: ICD-10-CM

## 2018-07-13 DIAGNOSIS — I10 ESSENTIAL (PRIMARY) HYPERTENSION: ICD-10-CM

## 2018-07-13 DIAGNOSIS — Z98.890 OTHER SPECIFIED POSTPROCEDURAL STATES: ICD-10-CM

## 2018-07-13 LAB
ALBUMIN SERPL ELPH-MCNC: 3.8 G/DL — SIGNIFICANT CHANGE UP (ref 3.3–5)
ALP SERPL-CCNC: 68 U/L — SIGNIFICANT CHANGE UP (ref 40–120)
ALT FLD-CCNC: 31 U/L — SIGNIFICANT CHANGE UP (ref 12–78)
ANION GAP SERPL CALC-SCNC: 6 MMOL/L — SIGNIFICANT CHANGE UP (ref 5–17)
APTT BLD: 31.3 SEC — SIGNIFICANT CHANGE UP (ref 27.5–37.4)
AST SERPL-CCNC: 24 U/L — SIGNIFICANT CHANGE UP (ref 15–37)
BASOPHILS # BLD AUTO: 0.03 K/UL — SIGNIFICANT CHANGE UP (ref 0–0.2)
BASOPHILS NFR BLD AUTO: 0.4 % — SIGNIFICANT CHANGE UP (ref 0–2)
BILIRUB SERPL-MCNC: 0.9 MG/DL — SIGNIFICANT CHANGE UP (ref 0.2–1.2)
BUN SERPL-MCNC: 26 MG/DL — HIGH (ref 7–23)
CALCIUM SERPL-MCNC: 9.1 MG/DL — SIGNIFICANT CHANGE UP (ref 8.5–10.1)
CHLORIDE SERPL-SCNC: 102 MMOL/L — SIGNIFICANT CHANGE UP (ref 96–108)
CO2 SERPL-SCNC: 28 MMOL/L — SIGNIFICANT CHANGE UP (ref 22–31)
CREAT SERPL-MCNC: 1.1 MG/DL — SIGNIFICANT CHANGE UP (ref 0.5–1.3)
EOSINOPHIL # BLD AUTO: 0.24 K/UL — SIGNIFICANT CHANGE UP (ref 0–0.5)
EOSINOPHIL NFR BLD AUTO: 3.2 % — SIGNIFICANT CHANGE UP (ref 0–6)
GLUCOSE SERPL-MCNC: 95 MG/DL — SIGNIFICANT CHANGE UP (ref 70–99)
HCT VFR BLD CALC: 38.7 % — LOW (ref 39–50)
HGB BLD-MCNC: 12.8 G/DL — LOW (ref 13–17)
IMM GRANULOCYTES NFR BLD AUTO: 0.3 % — SIGNIFICANT CHANGE UP (ref 0–1.5)
INR BLD: 1.08 RATIO — SIGNIFICANT CHANGE UP (ref 0.88–1.16)
LACTATE SERPL-SCNC: 1.6 MMOL/L — SIGNIFICANT CHANGE UP (ref 0.7–2)
LYMPHOCYTES # BLD AUTO: 1.94 K/UL — SIGNIFICANT CHANGE UP (ref 1–3.3)
LYMPHOCYTES # BLD AUTO: 26.1 % — SIGNIFICANT CHANGE UP (ref 13–44)
MCHC RBC-ENTMCNC: 27.4 PG — SIGNIFICANT CHANGE UP (ref 27–34)
MCHC RBC-ENTMCNC: 33.1 GM/DL — SIGNIFICANT CHANGE UP (ref 32–36)
MCV RBC AUTO: 82.7 FL — SIGNIFICANT CHANGE UP (ref 80–100)
MONOCYTES # BLD AUTO: 0.62 K/UL — SIGNIFICANT CHANGE UP (ref 0–0.9)
MONOCYTES NFR BLD AUTO: 8.4 % — SIGNIFICANT CHANGE UP (ref 2–14)
NEUTROPHILS # BLD AUTO: 4.57 K/UL — SIGNIFICANT CHANGE UP (ref 1.8–7.4)
NEUTROPHILS NFR BLD AUTO: 61.6 % — SIGNIFICANT CHANGE UP (ref 43–77)
NRBC # BLD: 0 /100 WBCS — SIGNIFICANT CHANGE UP (ref 0–0)
PLATELET # BLD AUTO: 331 K/UL — SIGNIFICANT CHANGE UP (ref 150–400)
POTASSIUM SERPL-MCNC: 3.6 MMOL/L — SIGNIFICANT CHANGE UP (ref 3.5–5.3)
POTASSIUM SERPL-SCNC: 3.6 MMOL/L — SIGNIFICANT CHANGE UP (ref 3.5–5.3)
PROT SERPL-MCNC: 8.4 GM/DL — HIGH (ref 6–8.3)
PROTHROM AB SERPL-ACNC: 11.7 SEC — SIGNIFICANT CHANGE UP (ref 9.8–12.7)
RBC # BLD: 4.68 M/UL — SIGNIFICANT CHANGE UP (ref 4.2–5.8)
RBC # FLD: 15.9 % — HIGH (ref 10.3–14.5)
SODIUM SERPL-SCNC: 136 MMOL/L — SIGNIFICANT CHANGE UP (ref 135–145)
WBC # BLD: 7.42 K/UL — SIGNIFICANT CHANGE UP (ref 3.8–10.5)
WBC # FLD AUTO: 7.42 K/UL — SIGNIFICANT CHANGE UP (ref 3.8–10.5)

## 2018-07-13 PROCEDURE — 93010 ELECTROCARDIOGRAM REPORT: CPT

## 2018-07-13 PROCEDURE — 71045 X-RAY EXAM CHEST 1 VIEW: CPT | Mod: 26

## 2018-07-13 PROCEDURE — 99284 EMERGENCY DEPT VISIT MOD MDM: CPT

## 2018-07-13 PROCEDURE — 73630 X-RAY EXAM OF FOOT: CPT | Mod: 26,LT

## 2018-07-13 RX ORDER — AZTREONAM 2 G
1000 VIAL (EA) INJECTION EVERY 6 HOURS
Qty: 0 | Refills: 0 | Status: DISCONTINUED | OUTPATIENT
Start: 2018-07-13 | End: 2018-07-13

## 2018-07-13 RX ADMIN — Medication 100 MILLIGRAM(S): at 21:23

## 2018-07-13 NOTE — ED STATDOCS - ATTENDING CONTRIBUTION TO CARE
I Perico Johnson MD saw and examined the patient. MLP saw and examined the patient under my supervision. I discussed the care of the patient with MLP and agree with MLP's plan, assessment and care of the patient while in the ED.

## 2018-07-13 NOTE — ED STATDOCS - OBJECTIVE STATEMENT
77 y/o male with a PMHx of DVT, HTN, osteoarthritis, depression presents to the ED c/o left toe pain. Pt notes he went to the podiatrist concerning an infection. +erythema for 4 days. No other acute symptoms at this time.

## 2018-07-13 NOTE — ED STATDOCS - PROGRESS NOTE DETAILS
Patient was admitted to medicine. Podiatry resident saw and examined the patient. Patient as per podiatry does not need admission and can go home. Patient and family wish to leave. Outpatient follow up and instructions to return if any worsening of symptoms provided. Patient was admitted to medicine. Podiatry resident saw and examined the patient, spoke with podiatry attending who recommends against admission. Patient as per podiatry does not need admission and can go home. Patient and family wish to leave. Outpatient follow up and instructions to return if any worsening of symptoms provided.

## 2018-07-13 NOTE — ED STATDOCS - NS_ ATTENDINGSCRIBEDETAILS _ED_A_ED_FT
I Perico Johnson MD saw and examined the patient. Scribe documented for me and under my supervision. I have modified the scribe's documentation where necessary to reflect my history, physical exam and other relevant documentations pertinent to the care of the patient.

## 2018-07-19 LAB
CULTURE RESULTS: SIGNIFICANT CHANGE UP
CULTURE RESULTS: SIGNIFICANT CHANGE UP
SPECIMEN SOURCE: SIGNIFICANT CHANGE UP
SPECIMEN SOURCE: SIGNIFICANT CHANGE UP

## 2018-07-27 ENCOUNTER — APPOINTMENT (OUTPATIENT)
Dept: ORTHOPEDIC SURGERY | Facility: CLINIC | Age: 78
End: 2018-07-27
Payer: MEDICARE

## 2018-07-27 VITALS
BODY MASS INDEX: 33.59 KG/M2 | DIASTOLIC BLOOD PRESSURE: 74 MMHG | WEIGHT: 248 LBS | HEART RATE: 80 BPM | SYSTOLIC BLOOD PRESSURE: 125 MMHG | HEIGHT: 72 IN

## 2018-07-27 PROCEDURE — 99213 OFFICE O/P EST LOW 20 MIN: CPT

## 2018-07-27 PROCEDURE — 73562 X-RAY EXAM OF KNEE 3: CPT | Mod: LT

## 2019-01-01 NOTE — OCCUPATIONAL THERAPY INITIAL EVALUATION ADULT - SOCIAL CONCERNS
Patient:   EMORY RIVERS            MRN: [id number]            FIN: [id number]              Age:   47 hours     Sex:  FEMALE     :  21   Associated Diagnoses:   Liveborn by    Author:   JOSE, CHANDNI KNAPP     Health Status   Allergies:    Allergic Reactions (All)  NKA     Physical Examination   VS/Measurements   Measurements from flowsheet : Height and Weight   19 00:00 CDT CLINICALWEIGHT 2.940 kg    Weight Method Measured    Weight Scale Infant Scale   19 00:00 CDT CLINICALWEIGHT 3.050 kg    Weight Method Measured    Weight Scale Infant Scale     ,    Vitals between:   2019 07:56:59   TO   2019 07:56:59                   LAST RESULT MINIMUM MAXIMUM  Temperature 36.6 36.6 37.1  Heart Rate 132 124 148  Respiratory Rate 38 38 55    General:  No acute distress, Alert.    Developmental milestones:  1 - 4 weeks.    Eye:  Pupils are equal, round and reactive to light, Normal conjunctiva.   HENT:  Normocephalic, Anterior fontanelle open/soft/flat.    Neck:  Supple.    Respiratory:  Lungs are clear to auscultation, Respirations are non-labored.   Cardiovascular:  Normal rate, Regular rhythm, No murmur.    Gastrointestinal:  Soft, Non-distended, Normal bowel sounds.    Genitourinary:  Normal genitalia for age and sex.    Musculoskeletal     Normal range of motion.     Integumentary:  Warm, Dry, Pink.    Neurologic:  Alert.      Impression and Plan   Diagnosis     Liveborn by  (VAF50-KY Z38.01, Diagnosis, Hospitalized, Medical).    Course:  Progressing as expected.    Education and Follow-up:       Discharge Planning: Plan to discharge ( In  1-2  days ).   None

## 2019-01-11 ENCOUNTER — APPOINTMENT (OUTPATIENT)
Dept: ORTHOPEDIC SURGERY | Facility: CLINIC | Age: 79
End: 2019-01-11
Payer: MEDICARE

## 2019-01-11 VITALS
HEART RATE: 72 BPM | BODY MASS INDEX: 33.86 KG/M2 | TEMPERATURE: 98.4 F | DIASTOLIC BLOOD PRESSURE: 81 MMHG | SYSTOLIC BLOOD PRESSURE: 137 MMHG | HEIGHT: 72 IN | WEIGHT: 250 LBS

## 2019-01-11 DIAGNOSIS — Z78.9 OTHER SPECIFIED HEALTH STATUS: ICD-10-CM

## 2019-01-11 DIAGNOSIS — Z96.652 PRESENCE OF LEFT ARTIFICIAL KNEE JOINT: ICD-10-CM

## 2019-01-11 DIAGNOSIS — Z82.61 FAMILY HISTORY OF ARTHRITIS: ICD-10-CM

## 2019-01-11 PROCEDURE — 99214 OFFICE O/P EST MOD 30 MIN: CPT

## 2019-01-11 PROCEDURE — 73562 X-RAY EXAM OF KNEE 3: CPT | Mod: LT

## 2019-01-11 NOTE — DISCUSSION/SUMMARY
[de-identified] : X-rays of the knee were reviewed with the patient. The patient has made fair progress since his last office visit. He continues to have difficulties navigating long distances. He cannot walk more than 200 feet. At this time he is indicated for permanent and he Parking sticker. He will continue his activities as tolerated. He will be seen back one year from the time of surgery. All questions are answered to patient's satisfaction

## 2019-01-11 NOTE — REASON FOR VISIT
[Follow-Up Visit] : a follow-up visit for [Artificial Knee Joint] : artificial knee joint [Knee Pain] : knee pain [FreeTextEntry2] : Yearly checkup s/p Left TKR 1/18/18

## 2019-01-11 NOTE — HISTORY OF PRESENT ILLNESS
[de-identified] : Patient is in safe for evaluation of a left knee revision arthroplasty. He is one year from surgery. He reports the knee is doing well. He does have intermittent pain of the knee. He is unable to ambulate long distances. He still has an uneasiness and buckling-like sensation and time when descending stairs. He has not had any trauma or falls or injuries to the knee. He does not currently use a cane or walker. No swelling or redness or fevers or chills are associated with any pain. No wound complications are reported.\par \par Review of Systems-\par Constitutional: No fever or chills. \par Cardiovascular: No orthopnea or chest pain\par Pulmonary: No shortness of breath. \par GI: No nausea or vomiting or abdominal pain.\par Musculoskeletal: see HPI \par Psychiatric: No anxiety and depression. Discharged

## 2019-01-11 NOTE — PHYSICAL EXAM
[de-identified] : The patient appears well nourished and in no apparent distress. The patient is alert and oriented to person, place, and time. Affect and mood appear normal. The head is normocephalic and atraumatic. Skin shows normal turgor with no evidence of eczema or psoriasis. No respiratory distress noted. Sensation grossly intact. MUSCULOSKELETAL:   SEE BELOW\par \par Left knee exam demonstrates a normal lung. Well-healed surgical incision. No significant effusions. No warmth. No erythema. No tenderness to palpation. Active extension is 0° and flexion is measured at 116°. There is discomfort with terminal flexion. No calf tenderness. No venous stasis. [de-identified] : X-rays of the right knee were reviewed. Stem revision hardware of the tibia and femur and noted to be in good position. No signs of loosening or failure.

## 2019-07-25 ENCOUNTER — CHART COPY (OUTPATIENT)
Age: 79
End: 2019-07-25

## 2019-07-25 RX ORDER — AMOXICILLIN 500 MG/1
500 CAPSULE ORAL
Qty: 20 | Refills: 0 | Status: DISCONTINUED | COMMUNITY
Start: 2018-02-02 | End: 2019-07-25

## 2019-12-17 ENCOUNTER — CHART COPY (OUTPATIENT)
Age: 79
End: 2019-12-17

## 2020-05-08 NOTE — PATIENT PROFILE ADULT. - NS MD HP INPLANTS MED DEV
Artificial joint/Lens implant Artificial joint/Lens implant/None Hydroxyzine Pregnancy And Lactation Text: This medication is not safe during pregnancy and should not be taken. It is also excreted in breast milk and breast feeding isn't recommended.

## 2021-03-08 ENCOUNTER — APPOINTMENT (OUTPATIENT)
Dept: GASTROENTEROLOGY | Facility: CLINIC | Age: 81
End: 2021-03-08
Payer: MEDICARE

## 2021-03-08 VITALS
DIASTOLIC BLOOD PRESSURE: 60 MMHG | WEIGHT: 250 LBS | HEIGHT: 72 IN | BODY MASS INDEX: 33.86 KG/M2 | HEART RATE: 76 BPM | SYSTOLIC BLOOD PRESSURE: 94 MMHG

## 2021-03-08 DIAGNOSIS — R13.12 DYSPHAGIA, OROPHARYNGEAL PHASE: ICD-10-CM

## 2021-03-08 DIAGNOSIS — R05 COUGH: ICD-10-CM

## 2021-03-08 PROCEDURE — 99204 OFFICE O/P NEW MOD 45 MIN: CPT

## 2021-03-08 RX ORDER — OXYCODONE 5 MG/1
5 TABLET ORAL
Qty: 80 | Refills: 0 | Status: DISCONTINUED | COMMUNITY
Start: 2018-01-20 | End: 2021-03-08

## 2021-03-08 RX ORDER — APIXABAN 2.5 MG/1
2.5 TABLET, FILM COATED ORAL
Qty: 22 | Refills: 0 | Status: DISCONTINUED | COMMUNITY
Start: 2018-01-19 | End: 2021-03-08

## 2021-03-08 RX ORDER — DIFLUPREDNATE 0.5 MG/ML
0.05 EMULSION OPHTHALMIC
Qty: 5 | Refills: 0 | Status: DISCONTINUED | COMMUNITY
Start: 2017-09-27 | End: 2021-03-08

## 2021-03-08 RX ORDER — TADALAFIL 20 MG/1
20 TABLET, FILM COATED ORAL
Qty: 6 | Refills: 0 | Status: DISCONTINUED | COMMUNITY
Start: 2017-08-25 | End: 2021-03-08

## 2021-03-08 RX ORDER — CLINDAMYCIN HYDROCHLORIDE 300 MG/1
300 CAPSULE ORAL
Qty: 8 | Refills: 2 | Status: DISCONTINUED | COMMUNITY
Start: 2019-07-25 | End: 2021-03-08

## 2021-03-08 RX ORDER — PANTOPRAZOLE 40 MG/1
40 TABLET, DELAYED RELEASE ORAL
Qty: 90 | Refills: 0 | Status: DISCONTINUED | COMMUNITY
Start: 2018-01-12 | End: 2021-03-08

## 2021-03-08 RX ORDER — CELECOXIB 200 MG/1
200 CAPSULE ORAL
Qty: 30 | Refills: 0 | Status: DISCONTINUED | COMMUNITY
Start: 2017-04-18 | End: 2021-03-08

## 2021-03-08 RX ORDER — CLINDAMYCIN HYDROCHLORIDE 300 MG/1
300 CAPSULE ORAL
Qty: 20 | Refills: 2 | Status: DISCONTINUED | COMMUNITY
Start: 2019-12-17 | End: 2021-03-08

## 2021-03-08 RX ORDER — MUPIROCIN 20 MG/G
2 OINTMENT TOPICAL
Qty: 22 | Refills: 0 | Status: DISCONTINUED | COMMUNITY
Start: 2018-01-03 | End: 2021-03-08

## 2021-03-09 NOTE — ASSESSMENT
[FreeTextEntry1] : 81yo male with dysphagia\par \par Unclear if some aspiration - will check barium study\par Will need clearance and ?hold eliqius prior to procedure\par Counseled regarding chewing, cutting smaller pieces before attempting to swallow\par Will check egd with possible dilation

## 2021-03-09 NOTE — HISTORY OF PRESENT ILLNESS
[de-identified] : 81yo male with hx GERD, hx colon polyps\par Last colonoscopy 7/17 with hx colon polyps, due 7/22 for surveillance\par \par Over last several months he has developed dysphagia. He tolerates pills, has occasional coughing when drinking liquids and notices some larger solids getting stuck. He has arrhythmia and see Dr Marie and is on Eliquis

## 2021-03-12 ENCOUNTER — RESULT REVIEW (OUTPATIENT)
Age: 81
End: 2021-03-12

## 2021-03-12 ENCOUNTER — OUTPATIENT (OUTPATIENT)
Dept: OUTPATIENT SERVICES | Facility: HOSPITAL | Age: 81
LOS: 1 days | End: 2021-03-12
Payer: MEDICARE

## 2021-03-12 DIAGNOSIS — Z98.890 OTHER SPECIFIED POSTPROCEDURAL STATES: Chronic | ICD-10-CM

## 2021-03-12 DIAGNOSIS — R05 COUGH: ICD-10-CM

## 2021-03-12 DIAGNOSIS — Z98.49 CATARACT EXTRACTION STATUS, UNSPECIFIED EYE: Chronic | ICD-10-CM

## 2021-03-12 DIAGNOSIS — Z98.52 VASECTOMY STATUS: Chronic | ICD-10-CM

## 2021-03-12 DIAGNOSIS — Z96.653 PRESENCE OF ARTIFICIAL KNEE JOINT, BILATERAL: Chronic | ICD-10-CM

## 2021-03-12 PROCEDURE — 74230 X-RAY XM SWLNG FUNCJ C+: CPT

## 2021-03-12 PROCEDURE — 74230 X-RAY XM SWLNG FUNCJ C+: CPT | Mod: 26

## 2021-03-12 PROCEDURE — 92611 MOTION FLUOROSCOPY/SWALLOW: CPT | Mod: GN

## 2021-03-12 NOTE — SWALLOW VFSS/MBS ASSESSMENT ADULT - PHARYNGEAL PHASE COMMENTS
Swallow triggered in an acceptable time frame for age. Hyolaryngeal and epiglottic retroflexion during the swallow were within functional parameters for age with adequate prandial airway protection. Pharyngeal motility was functional for age as well. Cricopharyngeal sphincter opening was functional. NO LARYNGEAL PENETRATION, ASPIRATION, PHARYNGEAL RETENTION, LARYNGOPHARYNGEAL REFLUX OR ASPIRATION WERE DEMONSTRATED UNDER FLUOROSCOPIC CONTROL.

## 2021-03-12 NOTE — SWALLOW VFSS/MBS ASSESSMENT ADULT - COMMENTS
The patient was referred for an outpatient Modified Barium Swallowing Study(MBS) by Dr Preston. Medical history is reportedly remarkable for atrial fibrillation status post ablation/placement of a loop recorder, GERD, HTN, previous bilateral cataract extractions, prior DVT, past hernia repair, prior vasectomy, and OA status post previous bilateral knee replacements. The patient is currently on a regular texture diet. He stated that he sometimes coughs when eating. Coughing episodes are not food consistency or situation specific. He denied odynophagia or weight loss. The patient was referred for an outpatient Modified Barium Swallowing Study(MBS) by Dr Preston. Medical history is reportedly remarkable for atrial fibrillation status post ablation/placement of a loop recorder, GERD, HTN, previous bilateral cataract extractions, prior DVT, past hernia repair, prior vasectomy, and OA status post previous bilateral knee replacements. The patient is currently on a regular texture diet. He stated that he sometimes coughs when eating. Coughing episodes are reportedly not food consistency or situation specific. He denied odynophagia or weight loss.

## 2021-03-12 NOTE — SWALLOW VFSS/MBS ASSESSMENT ADULT - ORAL PREP COMMENTS
The patient was oriented to feeding situ, and demonstrated functional labial grading on utensils. No dribbling was noted.

## 2021-03-12 NOTE — SWALLOW VFSS/MBS ASSESSMENT ADULT - DIAGNOSTIC IMPRESSIONS
THE PATIENT DEMONSTRATES FUNCTIONAL OROPHARYNGEAL SWALLOWING ABILITIES FOR AGE. NO LARYNGEAL PENETRATION, ASPIRATION, PHARYNGEAL RETENTION, LARYNGOPHARYNGEAL REFLUX OR ASPIRATION WERE DEMONSTRATED UNDER FLUOROSCOPIC CONTROL.

## 2021-03-12 NOTE — SWALLOW VFSS/MBS ASSESSMENT ADULT - ORAL PHASE COMMENTS
Bolus formation/transfer were achieved via functional AP lingual actions and efficient rotary masticatory motions that were within age acceptable parameters. The patient cleared oral debris after age acceptable piecemeal deglutition.

## 2021-03-12 NOTE — SWALLOW VFSS/MBS ASSESSMENT ADULT - ADDITIONAL INFORMATION
The patient was postured upright in a NORM chair and was studied in the lateral plane. Preliminary fluoroscopy was notable full maxillary denture placement and partial mandibular bridge placement. The patient was postured upright in a NORM chair for testing and was studied in the lateral plane. Preliminary fluoroscopy was notable full maxillary denture placement and partial mandibular bridge placement.

## 2021-03-13 DIAGNOSIS — R05 COUGH: ICD-10-CM

## 2021-04-08 DIAGNOSIS — Z01.818 ENCOUNTER FOR OTHER PREPROCEDURAL EXAMINATION: ICD-10-CM

## 2021-04-09 ENCOUNTER — APPOINTMENT (OUTPATIENT)
Dept: DISASTER EMERGENCY | Facility: CLINIC | Age: 81
End: 2021-04-09

## 2021-04-10 LAB — SARS-COV-2 N GENE NPH QL NAA+PROBE: NOT DETECTED

## 2021-04-13 ENCOUNTER — APPOINTMENT (OUTPATIENT)
Dept: GASTROENTEROLOGY | Facility: AMBULATORY MEDICAL SERVICES | Age: 81
End: 2021-04-13
Payer: MEDICARE

## 2021-04-13 ENCOUNTER — RESULT REVIEW (OUTPATIENT)
Age: 81
End: 2021-04-13

## 2021-04-13 PROCEDURE — 43239 EGD BIOPSY SINGLE/MULTIPLE: CPT

## 2021-05-20 ENCOUNTER — APPOINTMENT (OUTPATIENT)
Dept: GASTROENTEROLOGY | Facility: CLINIC | Age: 81
End: 2021-05-20
Payer: MEDICARE

## 2021-05-20 VITALS
WEIGHT: 251 LBS | DIASTOLIC BLOOD PRESSURE: 78 MMHG | TEMPERATURE: 98 F | HEART RATE: 74 BPM | BODY MASS INDEX: 34 KG/M2 | SYSTOLIC BLOOD PRESSURE: 110 MMHG | HEIGHT: 72 IN

## 2021-05-20 DIAGNOSIS — K21.9 GASTRO-ESOPHAGEAL REFLUX DISEASE W/OUT ESOPHAGITIS: ICD-10-CM

## 2021-05-20 DIAGNOSIS — R13.10 DYSPHAGIA, UNSPECIFIED: ICD-10-CM

## 2021-05-20 DIAGNOSIS — Z09 ENCOUNTER FOR FOLLOW-UP EXAMINATION AFTER COMPLETED TREATMENT FOR CONDITIONS OTHER THAN MALIGNANT NEOPLASM: ICD-10-CM

## 2021-05-20 PROCEDURE — 99072 ADDL SUPL MATRL&STAF TM PHE: CPT

## 2021-05-20 PROCEDURE — 99212 OFFICE O/P EST SF 10 MIN: CPT

## 2021-05-23 PROBLEM — R13.10 ESOPHAGEAL DYSPHAGIA: Status: ACTIVE | Noted: 2021-03-08

## 2021-05-23 PROBLEM — K21.9 CHRONIC GERD: Status: ACTIVE | Noted: 2021-05-23

## 2021-05-23 PROBLEM — Z09 FOLLOW UP: Status: ACTIVE | Noted: 2021-05-20

## 2021-05-23 NOTE — PHYSICAL EXAM
[General Appearance - Alert] : alert [General Appearance - In No Acute Distress] : in no acute distress [Auscultation Breath Sounds / Voice Sounds] : lungs were clear to auscultation bilaterally [Heart Rate And Rhythm] : heart rate was normal and rhythm regular [Heart Sounds] : normal S1 and S2 [Heart Sounds Gallop] : no gallops [Murmurs] : no murmurs [Heart Sounds Pericardial Friction Rub] : no pericardial rub [Abdomen Soft] : soft [Bowel Sounds] : normal bowel sounds [Abdomen Tenderness] : non-tender [] : no hepato-splenomegaly [Abdomen Mass (___ Cm)] : no abdominal mass palpated [Abnormal Walk] : normal gait [Nail Clubbing] : no clubbing  or cyanosis of the fingernails [Musculoskeletal - Swelling] : no joint swelling seen [Motor Tone] : muscle strength and tone were normal [Oriented To Time, Place, And Person] : oriented to person, place, and time [Impaired Insight] : insight and judgment were intact [Affect] : the affect was normal

## 2021-05-23 NOTE — HISTORY OF PRESENT ILLNESS
[de-identified] : 81yo male with gerd overall doing well\par He has had no further dysphagia\par He has been on omeprazole and not feeling gerd

## 2021-05-23 NOTE — ASSESSMENT
[FreeTextEntry1] : 79yo male with gerd, dysphagia\par \par He is feeling much better on PPI daily\par Patient counseled regarding potential increased risk of osteoporosis, and appropriate prophylaxis and screening recommended.\par

## 2021-06-07 ENCOUNTER — RX RENEWAL (OUTPATIENT)
Age: 81
End: 2021-06-07

## 2021-07-31 ENCOUNTER — EMERGENCY (EMERGENCY)
Facility: HOSPITAL | Age: 81
LOS: 0 days | Discharge: HOME | End: 2021-07-31
Attending: EMERGENCY MEDICINE
Payer: MEDICARE

## 2021-07-31 VITALS
HEART RATE: 66 BPM | OXYGEN SATURATION: 98 % | SYSTOLIC BLOOD PRESSURE: 132 MMHG | WEIGHT: 255.07 LBS | TEMPERATURE: 98 F | RESPIRATION RATE: 18 BRPM | HEIGHT: 72 IN | DIASTOLIC BLOOD PRESSURE: 74 MMHG

## 2021-07-31 VITALS
SYSTOLIC BLOOD PRESSURE: 138 MMHG | HEART RATE: 60 BPM | RESPIRATION RATE: 18 BRPM | OXYGEN SATURATION: 99 % | DIASTOLIC BLOOD PRESSURE: 80 MMHG

## 2021-07-31 DIAGNOSIS — Z88.0 ALLERGY STATUS TO PENICILLIN: ICD-10-CM

## 2021-07-31 DIAGNOSIS — Z86.718 PERSONAL HISTORY OF OTHER VENOUS THROMBOSIS AND EMBOLISM: ICD-10-CM

## 2021-07-31 DIAGNOSIS — I48.91 UNSPECIFIED ATRIAL FIBRILLATION: ICD-10-CM
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
## 2021-07-31 DIAGNOSIS — Z98.890 OTHER SPECIFIED POSTPROCEDURAL STATES: ICD-10-CM

## 2021-07-31 DIAGNOSIS — Z98.52 VASECTOMY STATUS: ICD-10-CM

## 2021-07-31 DIAGNOSIS — K21.9 GASTRO-ESOPHAGEAL REFLUX DISEASE WITHOUT ESOPHAGITIS: ICD-10-CM

## 2021-07-31 DIAGNOSIS — Z88.1 ALLERGY STATUS TO OTHER ANTIBIOTIC AGENTS STATUS: ICD-10-CM

## 2021-07-31 DIAGNOSIS — M19.90 UNSPECIFIED OSTEOARTHRITIS, UNSPECIFIED SITE: ICD-10-CM

## 2021-07-31 DIAGNOSIS — R07.81 PLEURODYNIA: ICD-10-CM
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
## 2021-07-31 DIAGNOSIS — Z23 ENCOUNTER FOR IMMUNIZATION: ICD-10-CM

## 2021-07-31 DIAGNOSIS — I10 ESSENTIAL (PRIMARY) HYPERTENSION: ICD-10-CM

## 2021-07-31 DIAGNOSIS — Z79.01 LONG TERM (CURRENT) USE OF ANTICOAGULANTS: ICD-10-CM

## 2021-07-31 DIAGNOSIS — Z98.52 VASECTOMY STATUS: Chronic | ICD-10-CM

## 2021-07-31 DIAGNOSIS — Z88.5 ALLERGY STATUS TO NARCOTIC AGENT: ICD-10-CM

## 2021-07-31 DIAGNOSIS — Z96.653 PRESENCE OF ARTIFICIAL KNEE JOINT, BILATERAL: Chronic | ICD-10-CM

## 2021-07-31 DIAGNOSIS — Z98.49 CATARACT EXTRACTION STATUS, UNSPECIFIED EYE: ICD-10-CM

## 2021-07-31 DIAGNOSIS — S22.43XA MULTIPLE FRACTURES OF RIBS, BILATERAL, INITIAL ENCOUNTER FOR CLOSED FRACTURE: ICD-10-CM

## 2021-07-31 DIAGNOSIS — Z98.49 CATARACT EXTRACTION STATUS, UNSPECIFIED EYE: Chronic | ICD-10-CM

## 2021-07-31 DIAGNOSIS — Z96.653 PRESENCE OF ARTIFICIAL KNEE JOINT, BILATERAL: ICD-10-CM

## 2021-07-31 DIAGNOSIS — Z98.890 OTHER SPECIFIED POSTPROCEDURAL STATES: Chronic | ICD-10-CM

## 2021-07-31 DIAGNOSIS — F32.9 MAJOR DEPRESSIVE DISORDER, SINGLE EPISODE, UNSPECIFIED: ICD-10-CM

## 2021-07-31 LAB
ADD ON TEST-SPECIMEN IN LAB: SIGNIFICANT CHANGE UP
ALBUMIN SERPL ELPH-MCNC: 3.5 G/DL — SIGNIFICANT CHANGE UP (ref 3.3–5)
ALP SERPL-CCNC: 54 U/L — SIGNIFICANT CHANGE UP (ref 40–120)
ALT FLD-CCNC: 39 U/L — SIGNIFICANT CHANGE UP (ref 12–78)
ANION GAP SERPL CALC-SCNC: 3 MMOL/L — LOW (ref 5–17)
APTT BLD: 35.5 SEC — SIGNIFICANT CHANGE UP (ref 27.5–35.5)
AST SERPL-CCNC: 31 U/L — SIGNIFICANT CHANGE UP (ref 15–37)
BASOPHILS # BLD AUTO: 0.03 K/UL — SIGNIFICANT CHANGE UP (ref 0–0.2)
BASOPHILS NFR BLD AUTO: 0.3 % — SIGNIFICANT CHANGE UP (ref 0–2)
BILIRUB SERPL-MCNC: 1.2 MG/DL — SIGNIFICANT CHANGE UP (ref 0.2–1.2)
BUN SERPL-MCNC: 18 MG/DL — SIGNIFICANT CHANGE UP (ref 7–23)
CALCIUM SERPL-MCNC: 9.3 MG/DL — SIGNIFICANT CHANGE UP (ref 8.5–10.1)
CHLORIDE SERPL-SCNC: 106 MMOL/L — SIGNIFICANT CHANGE UP (ref 96–108)
CO2 SERPL-SCNC: 29 MMOL/L — SIGNIFICANT CHANGE UP (ref 22–31)
CREAT SERPL-MCNC: 1.14 MG/DL — SIGNIFICANT CHANGE UP (ref 0.5–1.3)
EOSINOPHIL # BLD AUTO: 0.22 K/UL — SIGNIFICANT CHANGE UP (ref 0–0.5)
EOSINOPHIL NFR BLD AUTO: 2.5 % — SIGNIFICANT CHANGE UP (ref 0–6)
GLUCOSE SERPL-MCNC: 97 MG/DL — SIGNIFICANT CHANGE UP (ref 70–99)
HCT VFR BLD CALC: 41.6 % — SIGNIFICANT CHANGE UP (ref 39–50)
HGB BLD-MCNC: 13.5 G/DL — SIGNIFICANT CHANGE UP (ref 13–17)
IMM GRANULOCYTES NFR BLD AUTO: 0.3 % — SIGNIFICANT CHANGE UP (ref 0–1.5)
INR BLD: 1.24 RATIO — HIGH (ref 0.88–1.16)
LYMPHOCYTES # BLD AUTO: 1.38 K/UL — SIGNIFICANT CHANGE UP (ref 1–3.3)
LYMPHOCYTES # BLD AUTO: 16 % — SIGNIFICANT CHANGE UP (ref 13–44)
MCHC RBC-ENTMCNC: 28.4 PG — SIGNIFICANT CHANGE UP (ref 27–34)
MCHC RBC-ENTMCNC: 32.5 GM/DL — SIGNIFICANT CHANGE UP (ref 32–36)
MCV RBC AUTO: 87.4 FL — SIGNIFICANT CHANGE UP (ref 80–100)
MONOCYTES # BLD AUTO: 0.7 K/UL — SIGNIFICANT CHANGE UP (ref 0–0.9)
MONOCYTES NFR BLD AUTO: 8.1 % — SIGNIFICANT CHANGE UP (ref 2–14)
NEUTROPHILS # BLD AUTO: 6.29 K/UL — SIGNIFICANT CHANGE UP (ref 1.8–7.4)
NEUTROPHILS NFR BLD AUTO: 72.8 % — SIGNIFICANT CHANGE UP (ref 43–77)
PLATELET # BLD AUTO: 372 K/UL — SIGNIFICANT CHANGE UP (ref 150–400)
POTASSIUM SERPL-MCNC: 4 MMOL/L — SIGNIFICANT CHANGE UP (ref 3.5–5.3)
POTASSIUM SERPL-SCNC: 4 MMOL/L — SIGNIFICANT CHANGE UP (ref 3.5–5.3)
PROT SERPL-MCNC: 8.1 GM/DL — SIGNIFICANT CHANGE UP (ref 6–8.3)
PROTHROM AB SERPL-ACNC: 14.4 SEC — HIGH (ref 10.6–13.6)
RBC # BLD: 4.76 M/UL — SIGNIFICANT CHANGE UP (ref 4.2–5.8)
RBC # FLD: 13.8 % — SIGNIFICANT CHANGE UP (ref 10.3–14.5)
SODIUM SERPL-SCNC: 138 MMOL/L — SIGNIFICANT CHANGE UP (ref 135–145)
WBC # BLD: 8.65 K/UL — SIGNIFICANT CHANGE UP (ref 3.8–10.5)
WBC # FLD AUTO: 8.65 K/UL — SIGNIFICANT CHANGE UP (ref 3.8–10.5)

## 2021-07-31 PROCEDURE — 70450 CT HEAD/BRAIN W/O DYE: CPT

## 2021-07-31 PROCEDURE — 99283 EMERGENCY DEPT VISIT LOW MDM: CPT

## 2021-07-31 PROCEDURE — 93005 ELECTROCARDIOGRAM TRACING: CPT

## 2021-07-31 PROCEDURE — 72125 CT NECK SPINE W/O DYE: CPT

## 2021-07-31 PROCEDURE — 84484 ASSAY OF TROPONIN QUANT: CPT

## 2021-07-31 PROCEDURE — 93010 ELECTROCARDIOGRAM REPORT: CPT

## 2021-07-31 PROCEDURE — 36415 COLL VENOUS BLD VENIPUNCTURE: CPT

## 2021-07-31 PROCEDURE — 85730 THROMBOPLASTIN TIME PARTIAL: CPT

## 2021-07-31 PROCEDURE — 85025 COMPLETE CBC W/AUTO DIFF WBC: CPT

## 2021-07-31 PROCEDURE — 70450 CT HEAD/BRAIN W/O DYE: CPT | Mod: 26

## 2021-07-31 PROCEDURE — 90471 IMMUNIZATION ADMIN: CPT

## 2021-07-31 PROCEDURE — 71260 CT THORAX DX C+: CPT | Mod: 26,MA

## 2021-07-31 PROCEDURE — 71260 CT THORAX DX C+: CPT

## 2021-07-31 PROCEDURE — 90715 TDAP VACCINE 7 YRS/> IM: CPT

## 2021-07-31 PROCEDURE — 80053 COMPREHEN METABOLIC PANEL: CPT

## 2021-07-31 PROCEDURE — 99285 EMERGENCY DEPT VISIT HI MDM: CPT

## 2021-07-31 PROCEDURE — 99284 EMERGENCY DEPT VISIT MOD MDM: CPT | Mod: 25

## 2021-07-31 PROCEDURE — 72125 CT NECK SPINE W/O DYE: CPT | Mod: 26

## 2021-07-31 PROCEDURE — 85610 PROTHROMBIN TIME: CPT

## 2021-07-31 RX ORDER — ACETAMINOPHEN 500 MG
975 TABLET ORAL ONCE
Refills: 0 | Status: COMPLETED | OUTPATIENT
Start: 2021-07-31 | End: 2021-07-31

## 2021-07-31 RX ORDER — LIDOCAINE 4 G/100G
1 CREAM TOPICAL
Qty: 30 | Refills: 0
Start: 2021-07-31 | End: 2021-08-29

## 2021-07-31 RX ORDER — LIDOCAINE 4 G/100G
1 CREAM TOPICAL ONCE
Refills: 0 | Status: COMPLETED | OUTPATIENT
Start: 2021-07-31 | End: 2021-07-31

## 2021-07-31 RX ORDER — TETANUS TOXOID, REDUCED DIPHTHERIA TOXOID AND ACELLULAR PERTUSSIS VACCINE, ADSORBED 5; 2.5; 8; 8; 2.5 [IU]/.5ML; [IU]/.5ML; UG/.5ML; UG/.5ML; UG/.5ML
0.5 SUSPENSION INTRAMUSCULAR ONCE
Refills: 0 | Status: COMPLETED | OUTPATIENT
Start: 2021-07-31 | End: 2021-07-31

## 2021-07-31 RX ORDER — LIDOCAINE 4 G/100G
1 CREAM TOPICAL ONCE
Refills: 0 | Status: DISCONTINUED | OUTPATIENT
Start: 2021-07-31 | End: 2021-07-31

## 2021-07-31 RX ADMIN — LIDOCAINE 1 PATCH: 4 CREAM TOPICAL at 13:20

## 2021-07-31 RX ADMIN — TETANUS TOXOID, REDUCED DIPHTHERIA TOXOID AND ACELLULAR PERTUSSIS VACCINE, ADSORBED 0.5 MILLILITER(S): 5; 2.5; 8; 8; 2.5 SUSPENSION INTRAMUSCULAR at 13:18

## 2021-07-31 RX ADMIN — Medication 975 MILLIGRAM(S): at 13:18

## 2021-07-31 NOTE — CONSULT NOTE ADULT - ASSESSMENT
81 year old male s/p fall yesterday on Eliquis for AFib, found to have b/l nondisplaced rib fx of 5/6/7th ribs  Pts pain is controlled with lidocaine patch and tylenol  no surgical intervention needed from trauma sx standpoint  stable for discharge, d/w patient to return to ED if symptoms worsen  plan d/w Dr. Álvarez

## 2021-07-31 NOTE — ED PROVIDER NOTE - PATIENT PORTAL LINK FT
You can access the FollowMyHealth Patient Portal offered by [facility] by registering at the following website: [email or web address]. By joining [employer]’s FollowMyHealth portal, you will also be able to view your health information using other applications (apps) compatible with our system.

## 2021-07-31 NOTE — ED ADULT NURSE REASSESSMENT NOTE - NS ED NURSE REASSESS COMMENT FT1
pt resting comfortably, reports pain subsiding, vitals stable, updated by md azul regarding rib fx, awaiting trauma consult

## 2021-07-31 NOTE — PROGRESS NOTE ADULT - SUBJECTIVE AND OBJECTIVE BOX
o81 y/o M with PMHx of AFIB (on Eliquis), Gastroesophageal reflux disease, Hypertension, Depression,  Deep vein thrombosis right leg s/p right knee replacement 2007, Osteoarthritis,  presents to ED s/p fall yesterday. Patient states he got up from sitting position and tripped on a step, hitting his head and chest wall to ground yesterday. He is unsure whether he lost consciousness. + Abrasion to nose, + chest wall pain.  Patient states severity of pain is about a 6 or 7. No pain in legs or arms. Wife at bedside. Wife states patient has had a couple slight falls before. Denies sob, dizziness, cp, n/v, weakness.PAST MEDICAL & SURGICAL HISTORY:  Hypertension    Depression    Osteoarthritis    Deep vein thrombosis  right leg s/p right knee replacement 2007 treated with coumadin x 6 months    Gastroesophageal reflux disease    Afib    H/O total knee replacement, bilateral  left knee replacemt 2001, revision left knee replacement using cement 2010 ? h/o staph and strep infection   right knee replacement 2001 , revision right knee replacement 2002?, multiple right  knee surgeries done due to staph infection    H/O vasectomy    S/P cataract surgery  bilateral 2017    S/P hernia repair  2017

## 2021-07-31 NOTE — ED ADULT TRIAGE NOTE - CHIEF COMPLAINT QUOTE
pt c/o fall yesterday. pt reports he stood up from sitting position and then fell onto face injuring nose. Denies LOC. Pt takes daily Eliquis. Denies increased confusion/weakness. pt c/o fall yesterday. pt reports he stood up from sitting position and then fell onto face injuring nose. Denies LOC. Pt takes daily Eliquis. Denies increased confusion/weakness. trauma alert called at 1107

## 2021-07-31 NOTE — ED PROVIDER NOTE - CARE PROVIDER_API CALL
Emmett Álvarez  SURGERY  [address] [place] [address]  Phone: [phone]  Fax: [phone]  Follow Up Time: 1-3 Days

## 2021-07-31 NOTE — ED PROVIDER NOTE - PMH
Afib    Deep vein thrombosis  right leg s/p right knee replacement 2007 treated with coumadin x 6 months  Depression    Gastroesophageal reflux disease    Hypertension    Osteoarthritis

## 2021-07-31 NOTE — ED ADULT NURSE NOTE - OBJECTIVE STATEMENT
pt presents to ed ambulatory with wife for evaluation s/p mechanical fall yesterday with head strike, nose abrasion, and right side rip pain. pt on eliquis, trauma alert activated pt ambulatory, vitals stable, a&ox4, in no resp distress, gcs 15

## 2021-07-31 NOTE — ED PROVIDER NOTE - PHYSICAL EXAMINATION
Constitutional: NAD AAOx3  Eyes: PERRLA EOMI  Head: Normocephalic atraumatic  ENT: No vieira sign, no raccoon eyes, no hemotympanum, no csf rhinorrhea, no nasal septal hematoma  Mouth: MMM  Cardiac: regular rate   Resp: Lungs CTAB  GI: Abd s/nt/nd  Neuro: CN2-12 intact GCS 15  Skin: Abrasion to nose   Msk: Tenderness anterior 6th 7th ribs. Tenderness right anterior chest wall. Constitutional: NAD AAOx3  Eyes: PERRLA EOMI  Head: Normocephalic atraumatic  ENT: No vieira sign, no raccoon eyes,  no csf rhinorrhea, no nasal septal hematoma  Mouth: MMM  Cardiac: regular rate   Resp: Lungs CTAB  GI: Abd s/nt/nd  Neuro: CN2-12 intact GCS 15  Skin: Abrasion to nose   Msk: Tenderness anterior 6th 7th ribs bilaterally. Tenderness right anterior chest wall. No midline spinal ttp,  no ttp of facial bones, pelvis stable, full ROM of all extremities without any ttp of extremities

## 2021-07-31 NOTE — ED PROVIDER NOTE - NSFOLLOWUPINSTRUCTIONS_ED_ALL_ED_FT
1. return for worsening symptoms or anything concerning to you  2. take all home meds as prescribed  3. follow up with your pmd call to make an appointment  4. Take Tylenol 650 mg every 6 hours as needed for pain.  5. use lidopatches as directed  6. follow up with Dr. Álvarez  7. use incentive spirometer  8. look out for signs/symptoms of pneumonia    Rib Fracture    WHAT YOU NEED TO KNOW:    A rib fracture is a crack or break in a rib bone. Rib fractures usually heal within 6 weeks. You should be able to return to normal activities before that time. Do not wrap anything around your body to try to splint your ribs. This can prevent you from taking deep breaths and increases your risk for pneumonia.Rib Cage         DISCHARGE INSTRUCTIONS:    Call 911 for any of the following:     You have trouble breathing.      You have new or increased pain.    Return to the emergency department if:     Your pain does not get better, even after treatment.      You have a fever or a cough.     Contact your healthcare provider if:     You have questions or concerns about your condition or care.        Medicines:     NSAIDs help decrease swelling and pain. NSAIDs are available without a doctor's order. Ask your healthcare provider which medicine is right for you. Ask how much to take and when to take it. Take as directed. NSAIDs can cause stomach bleeding and kidney problems if not taken correctly.      Prescription pain medicine may be given. Ask your healthcare provider how to take this medicine safely. Some prescription pain medicines contain acetaminophen. Do not take other medicines that contain acetaminophen without talking to your healthcare provider. Too much acetaminophen may cause liver damage. Prescription pain medicine may cause constipation. Ask your healthcare provider how to prevent or treat constipation.       Take your medicine as directed. Contact your healthcare provider if you think your medicine is not helping or if you have side effects. Tell him or her if you are allergic to any medicine. Keep a list of the medicines, vitamins, and herbs you take. Include the amounts, and when and why you take them. Bring the list or the pill bottles to follow-up visits. Carry your medicine list with you in case of an emergency.    Follow up with your healthcare provider as directed: Write down your questions so you remember to ask them during your visits.    Deep breathing: Deep breathing will decrease your risk for pneumonia. Hug a pillow on the injured side while doing this exercise, to decrease pain. Take a deep breath and hold it for as long as possible. You should let the air out and then cough strongly. Deep breaths help open your airway. You may be given an incentive spirometer to help take deep breaths. Put the plastic piece in your mouth. Take a slow, deep breath. You should then let the air out and cough. Repeat these steps 10 times every hour.    Rest: Rest and limit activity to decrease swelling and pain, and allow your injury to heal. Avoid activities that may cause more pain or damage to your ribs such as, pulling, pushing, and lifting. As your pain decreases, begin movements slowly. Take short walks between rest periods.    Ice: Apply ice on the fractured area for 15 to 20 minutes every hour or as directed. Use an ice pack or put crushed ice in a plastic bag. Cover it with a towel. Ice helps prevent tissue damage and decreases swelling and pain.

## 2021-07-31 NOTE — ED PROVIDER NOTE - CLINICAL SUMMARY MEDICAL DECISION MAKING FREE TEXT BOX
80 y/o M with history of AFib (on Eliquis), presents to ED s/p fall yesterday.  Patient was getting up when he tripped on a step and hit his face and chest wall.  Now with persisting pain. Exam: abrasion to nose, tenderness anterior right chest wall, tenderness to 6th 7th ribs.  Plan: Will obtain  ct to rule out bleed or fracture, pain control, reassess. 82 y/o M with history of AFib (on Eliquis), presents to ED s/p fall yesterday.  Patient was getting up when he tripped on a step and hit his face and chest wall.  Now with persisting pain. Exam: abrasion to nose, tenderness anterior bilateral chest wall, tenderness to 6th 7th ribs.  Plan: Will obtain  ct to rule out bleed or fracture, pain control, reassess.

## 2021-07-31 NOTE — ED ADULT NURSE NOTE - NSIMPLEMENTINTERV_GEN_ALL_ED
Implemented All Fall with Harm Risk Interventions:  [place] to call system. Call bell, personal items and telephone within reach. Instruct patient to call for assistance. Room bathroom lighting operational. Non-slip footwear when patient is off stretcher. Physically safe environment: no spills, clutter or unnecessary equipment. Stretcher in lowest position, wheels locked, appropriate side rails in place. Provide visual cue, wrist band, yellow gown, etc. Monitor gait and stability. Monitor for mental status changes and reorient to person, place, and time. Review medications for side effects contributing to fall risk. Reinforce activity limits and safety measures with patient and family. Provide visual clues: red socks.

## 2021-07-31 NOTE — ED PROVIDER NOTE - OBJECTIVE STATEMENT
80 y/o M with PMHx of AFIB (on Eliquis) , presents to ED s/p fall yesterday. Patient states he got up from sitting position and tripped on a step, hitting his head and chest wall to ground yesterday. + Abrasion to nose, + chest wall pain.  Patient states severity of pain is about a 6 or 7. No pain in legs or arms. Wife at bedside. Wife states patient has had a couple slight falls before.   PCP Dr. Murguia. 80 y/o M with PMHx of AFIB (on Eliquis), Gastroesophageal reflux disease, Hypertension, Depression,  Deep vein thrombosis right leg s/p right knee replacement 2007, Osteoarthritis,  presents to ED s/p fall yesterday. Patient states he got up from sitting position and tripped on a step, hitting his head and chest wall to ground yesterday. + Abrasion to nose, + chest wall pain.  Patient states severity of pain is about a 6 or 7. No pain in legs or arms. Wife at bedside. Wife states patient has had a couple slight falls before.   PCP Dr. Murguia.

## 2021-07-31 NOTE — CONSULT NOTE ADULT - SUBJECTIVE AND OBJECTIVE BOX
82 y/o M with PMHx of AFIB (on Eliquis), Gastroesophageal reflux disease, Hypertension, Depression,  Deep vein thrombosis right leg s/p right knee replacement , Osteoarthritis,  presents to ED s/p fall yesterday. Patient states he got up from sitting position and tripped on a step, hitting his head and chest wall to ground yesterday. He is unsure whether he lost consciousness. + Abrasion to nose, + chest wall pain.  Patient states severity of pain is about a 6 or 7. No pain in legs or arms. Wife at bedside. Wife states patient has had a couple slight falls before. Denies sob, dizziness, cp, n/v, weakness.    ROS: all systems reviewed as stated above    PAST MEDICAL & SURGICAL HISTORY:  Hypertension    Depression    Osteoarthritis    Deep vein thrombosis  right leg s/p right knee replacement  treated with coumadin x 6 months    Gastroesophageal reflux disease    Afib    H/O total knee replacement, bilateral  left knee replacemt , revision left knee replacement using cement  ? h/o staph and strep infection   right knee replacement  , revision right knee replacement ?, multiple right  knee surgeries done due to staph infection    H/O vasectomy    S/P cataract surgery  bilateral 2017    S/P hernia repair  2017      Home Medications:  acetaminophen 500 mg oral tablet: 2 tab(s) orally every 12 hours (2018 14:53)  biotin 5000 mcg oral tablet, disintegratin tab(s) orally once a day (2018 11:52)  citalopram 20 mg oral tablet: 1 tab(s) orally once a day (2018 11:52)  docusate sodium 100 mg oral capsule: 1 cap(s) orally 3 times a day (2018 14:53)  losartan-hydrochlorothiazide 100mg-25mg oral tablet: orally once a day (2018 11:52)  multivitamin: 1   once a day (2018 11:52)  pantoprazole 40 mg oral delayed release tablet: orally once a day (2018 11:52)  polyethylene glycol 3350 oral powder for reconstitution: 17 gram(s) orally once a day, As needed, Constipation (2018 14:53)  senna oral tablet: 2 tab(s) orally once a day (at bedtime) (2018 14:53)  tamsulosin 0.4 mg oral capsule: 1 cap(s) orally once a day (2018 11:52)  Vitamin D3 1000 intl units oral capsule: orally once a day (2018 11:52)  	  Allergies    codeine (Nausea)  penicillin (Hives)  vancomycin (Nausea; Urticaria)    Intolerances      FAMILY HISTORY:  Family history of lung cancer    Family history of brain tumor (Mother)    Social History:  , lives at home with wife, denies smoking/hx of drug use    Vital Signs Last 24 Hrs  T(C): 36.7 (21 @ 11:12)  T(F): 98 (21 @ 11:12), Max: 98 (21 @ 11:12)  HR: 60 (21 @ 14:20) (60 - 66)  BP: 138/80 (21 @ 14:20)  BP(mean): 88 (21 @ 11:12) (88 - 88)  RR: 18 (21 @ 14:20) (18 - 18)  SpO2: 99% (21 @ 14:20) (98% - 99%)  Wt(kg): --      Physical Exam:  Constitutional: NAD AAOx3  Eyes: PERRLA EOMI  Head: Normocephalic atraumatic  ENT: No vieira sign, no raccoon eyes,  no csf rhinorrhea, no nasal septal hematoma, abrasion to nose  Cardiac: regular rate , normal s1s2  Resp: Lungs CTAB  GI: Abd s/nt/nd  Neuro: CN2-12 intact GCS 15  Skin: Abrasion to nose   Msk: Tenderness anterior 6th 7th ribs bilaterally. Tenderness right anterior chest wall. No midline spinal ttp,  no ttp of facial bones, pelvis stable, full ROM of all extremities without any ttp of extremities                          13.5   8.65  )-----------( 372      ( 2021 11:35 )             41.6         138  |  106  |  18  ----------------------------<  97  4.0   |  29  |  1.14    Ca    9.3      2021 11:35    TPro  8.1  /  Alb  3.5  /  TBili  1.2  /  DBili  x   /  AST  31  /  ALT  39  /  AlkPhos  54      PT/INR - ( 2021 11:35 )   PT: 14.4 sec;   INR: 1.24 ratio         PTT - ( 2021 11:35 )  PTT:35.5 sec    < from: CT Chest w/ IV Cont (21 @ 12:48) >  EXAM:  CT CHEST IC                            PROCEDURE DATE:  2021          INTERPRETATION:  CLINICAL INFORMATION: Status post fall yesterday, right anterior chest wall pain.    COMPARISON: Contrast-enhanced CT of the abdomen and pelvis dated 2011.    CONTRAST/COMPLICATIONS:  IV Contrast: Omnipaque 350  90 cc administered   10 cc discarded  Oral Contrast: Other  Complications: None reported at time of study completion    PROCEDURE:  CT of the Chest was performed.  Sagittal and coronal reformats were performed.    FINDINGS:    LUNGS AND AIRWAYS: Patent central airways. Trace paraseptal cystic changes at the apices. Lungs are clear.  PLEURA: Again are noted bilateral calcified and noncalcified pleural and diaphragmatic plaques. No pleural effusions. No hemothorax. No pneumothorax or pneumomediastinum.  MEDIASTINUM AND BRUNA: No lymphadenopathy.  VESSELS: Within normal limits.  HEART: Heart size is normal. No pericardial effusion.  CHEST WALL AND LOWER NECK: Within normal limits.  VISUALIZED UPPER ABDOMEN: Layering gallstones. Bilateral renal cysts. Colonic diverticula.  BONES: There is a plastic fracture deformity of the bilateral distal fifth, sixth, and seventh ribs. Chronic mild compression deformity of the superior endplate of L1.    IMPRESSION: Nondisplaced plastic fracture deformities of the bilateral fifth, sixth, and seventh ribs.    < end of copied text >

## 2021-07-31 NOTE — ED PROVIDER NOTE - NS_ ATTENDINGSCRIBEDETAILS _ED_A_ED_FT
I, Yogi Licona MD,  performed the initial face to face bedside interview with this patient regarding history of present illness, review of symptoms and relevant past medical, social and family history.  I completed an independent physical examination.  I was the initial provider who evaluated this patient.  The history, relevant review of systems, past medical and surgical history, medical decision making, and physical examination was documented by the scribe in my presence and I attest to the accuracy of the documentation.

## 2021-07-31 NOTE — PROGRESS NOTE ADULT - ASSESSMENT
Multiple rib fractures, s/p fall yesterday. No PTX, no hemothorax. Stable for DC home. Follow up in office.

## 2021-07-31 NOTE — ED PROVIDER NOTE - PROGRESS NOTE DETAILS
pt feeling better. pt seen by trauma service and cleared for discharge with lidopatches. I spoke with patient he is comfortable going home pain improved. discussed risk of pna and need for incentive spirometer and strict return precautions. pt aware and understands. will dc. Yogi Licona M.D., Attending Physician pt with bilateral 5th 6th and 7th fractures. spoke with Dr. Álvarez trauma surgeon will see patient shortly. Yogi Licona M.D., Attending Physician

## 2021-08-03 ENCOUNTER — RX RENEWAL (OUTPATIENT)
Age: 81
End: 2021-08-03

## 2021-09-23 NOTE — ED PROVIDER NOTE - EKG #1 DATE/TIME
Cesárea   LO QUE USTED DEBE SABER:   Un parto por cesárea es ike cirugía abdominal que se realiza para extraer a wilson bebé  Existen muchas razones por las que usted podría necesitar ike cesárea  · Joelle Stagers cesárea podría programarse antes del parto si usted tuvo otra cesárea con wilson último bebé  Ésta podría programarse si wilson bebé no está en ike posición normal, o si usted está embarazada con más de 1 bebé  · Wilson médico podría realizar ike cesárea de emergencia catherine el parto para evitar complicaciones que pongan en riesgo wilson lc o la del bebé  Ike cesárea podría realizarse si wilson cerviz no se dilata después de varias horas de [place]  · Otras razones para realizar ike cesárea incluyen infecciones maternales o con la placenta  DESPUÉS DE SER DADO DE WILLIAM:   Medicamentos:   · Podrían recetarle medicamentos para el dolor  Pregunte cómo tomarse lance medicamento de [employer]  · El acetaminofeno  disminuye el dolor y la [place]  Está disponible sin receta médica  Pregunte la cantidad que debe allison y con que frecuencia  [address]  El acetaminofeno puede provocar daño al hígado si no se lionel correctamente  · Los AINEs  ayudan a disminuir la inflamación y el dolor o la fiebre  Lance medicamento está disponible con o sin receta médica  Los AINEs puede provocar sangrado estomacal o problemas del [place] en ciertas personas  Si usted lionel medicamentos anticoagulantes siempre  consulte con wilson ginecólogo si los AINEs son seguros para usted  Siempre shanti la etiqueta del medicamento y [place] Nirali instrucciones  · Anza wilson medicamento cata se le indique  Comuníquese con wilson ginecólogo si usted piensa que wilson medicamento no lo está ayudando o si tiene efectos secundarios  Infórmele si usted es alérgico a cualquier medicamento  Mantenga ike lista de los medicamentos, vitaminas y hierbas que lionel  [address] cantidades, así cata cuándo y por qué los lionel   Lubertha Schimke con usted la lista o los envases de los medicamentos a christi citas de seguimiento  Lleve consigo la lista del medicamento en riddhi de ike emergencia  Programe ike mecca con wilson ginecólogo cata se le indique:  Es posible que usted necesite regresar para que le quiten los puntos de sutura o las grapas  Escriba las preguntas que tenga para que recuerde hacerlas catherine christi citas  Cuidado de la herida:  Tamir Gill cuidadosamente wilson herida con [place] y agua diariamente  Mantenga wilson [place] y seca  Use ropa suelta y cómoda que no roce la herida  Pregunte a wilson ginecólogo acerca de bañarse o ducharse  Ingiera suficientes líquidos:  Usted puede disminuir wilson riesgo de un coágulo de yaya si paras suficientes líquidos  Pregunte cuánto líquido debe ingerir cada día y cuáles son los mejores para usted  Limite la actividad hasta que usted se recupere completamente de la cirugía:   · Pregunte cuando estará usted hábil para manejar, subir escaleras, levantar objetos pesados y tener relaciones sexuales  · Pregunte cuando es apropiado que ejercite, y qué tipos de ejercicios puede realizar  Comience lentamente y cynthia más conforme se sienta más micah  Comuníquese con wilson ginecólogo si:   · Usted presenta sangrado vaginal que empapa 1 toalla higiénica o más en 1 hora  · Usted tiene fiebre  · Wilson incisión está inflamada, enrojecida o drena pus  · Usted tiene preguntas o inquietudes acerca de usted o de wilson bebé  [place]que atención médica inmediatamente o llame al 911 si:   · La yaya empapa wilson vendaje  · Christi puntos de sutura se desprenden  · Usted se siente mareado, con falta de aliento y tiene dolor en el pecho  · Usted tose yaya  · Wilson brazo o pierna se sienten calientes, sensibles, y dolorosos  Se podría nichole inflamado y coy  · Usted tiene sangrado vaginal abundante (usted empapa ike toalla sanitaria en 1 a 2 horas consecutivas)    © 2014 [address] is for End User's use only and may not be sold, redistributed or otherwise used for commercial purposes  All illustrations and images included in CareNotes® are the copyrighted property of A D A M , Inc  or Saul [place]  Esta información es sólo para uso en educación  Wilson intención no es darle un consejo médico sobre enfermedades o tratamientos  Colsulte con wilson Catherne Yousif farmacéutico antes de seguir cualquier régimen médico para saber si es seguro y efectivo para usted  31-Jul-2021 14:17

## 2021-09-28 ENCOUNTER — RX RENEWAL (OUTPATIENT)
Age: 81
End: 2021-09-28

## 2021-09-30 PROBLEM — I48.91 UNSPECIFIED ATRIAL FIBRILLATION: Chronic | Status: ACTIVE | Noted: 2021-07-31

## 2021-10-26 ENCOUNTER — OUTPATIENT (OUTPATIENT)
Dept: OUTPATIENT SERVICES | Facility: HOSPITAL | Age: 81
LOS: 1 days | End: 2021-10-26
Payer: MEDICARE

## 2021-10-26 DIAGNOSIS — Z98.890 OTHER SPECIFIED POSTPROCEDURAL STATES: Chronic | ICD-10-CM

## 2021-10-26 DIAGNOSIS — Z01.818 ENCOUNTER FOR OTHER PREPROCEDURAL EXAMINATION: ICD-10-CM

## 2021-10-26 DIAGNOSIS — K80.18 CALCULUS OF GALLBLADDER WITH OTHER CHOLECYSTITIS WITHOUT OBSTRUCTION: ICD-10-CM

## 2021-10-26 DIAGNOSIS — Z98.52 VASECTOMY STATUS: Chronic | ICD-10-CM

## 2021-10-26 DIAGNOSIS — Z96.653 PRESENCE OF ARTIFICIAL KNEE JOINT, BILATERAL: Chronic | ICD-10-CM

## 2021-10-26 DIAGNOSIS — Z98.49 CATARACT EXTRACTION STATUS, UNSPECIFIED EYE: Chronic | ICD-10-CM

## 2021-10-26 LAB
ALBUMIN SERPL ELPH-MCNC: 3.5 G/DL — SIGNIFICANT CHANGE UP (ref 3.3–5)
ALP SERPL-CCNC: 56 U/L — SIGNIFICANT CHANGE UP (ref 40–120)
ALT FLD-CCNC: 38 U/L — SIGNIFICANT CHANGE UP (ref 12–78)
AMYLASE P1 CFR SERPL: 41 U/L — SIGNIFICANT CHANGE UP (ref 25–115)
ANION GAP SERPL CALC-SCNC: 4 MMOL/L — LOW (ref 5–17)
APTT BLD: 35.2 SEC — SIGNIFICANT CHANGE UP (ref 27.5–35.5)
AST SERPL-CCNC: 25 U/L — SIGNIFICANT CHANGE UP (ref 15–37)
BASOPHILS # BLD AUTO: 0.04 K/UL — SIGNIFICANT CHANGE UP (ref 0–0.2)
BASOPHILS NFR BLD AUTO: 0.4 % — SIGNIFICANT CHANGE UP (ref 0–2)
BILIRUB SERPL-MCNC: 0.9 MG/DL — SIGNIFICANT CHANGE UP (ref 0.2–1.2)
BUN SERPL-MCNC: 19 MG/DL — SIGNIFICANT CHANGE UP (ref 7–23)
CALCIUM SERPL-MCNC: 9.5 MG/DL — SIGNIFICANT CHANGE UP (ref 8.5–10.1)
CHLORIDE SERPL-SCNC: 104 MMOL/L — SIGNIFICANT CHANGE UP (ref 96–108)
CO2 SERPL-SCNC: 30 MMOL/L — SIGNIFICANT CHANGE UP (ref 22–31)
CREAT SERPL-MCNC: 1.07 MG/DL — SIGNIFICANT CHANGE UP (ref 0.5–1.3)
EOSINOPHIL # BLD AUTO: 0.4 K/UL — SIGNIFICANT CHANGE UP (ref 0–0.5)
EOSINOPHIL NFR BLD AUTO: 3.7 % — SIGNIFICANT CHANGE UP (ref 0–6)
GLUCOSE SERPL-MCNC: 94 MG/DL — SIGNIFICANT CHANGE UP (ref 70–99)
HCT VFR BLD CALC: 40 % — SIGNIFICANT CHANGE UP (ref 39–50)
HGB BLD-MCNC: 13.1 G/DL — SIGNIFICANT CHANGE UP (ref 13–17)
IMM GRANULOCYTES NFR BLD AUTO: 0.4 % — SIGNIFICANT CHANGE UP (ref 0–1.5)
INR BLD: 1.45 RATIO — HIGH (ref 0.88–1.16)
LIDOCAIN IGE QN: 97 U/L — SIGNIFICANT CHANGE UP (ref 73–393)
LYMPHOCYTES # BLD AUTO: 2.32 K/UL — SIGNIFICANT CHANGE UP (ref 1–3.3)
LYMPHOCYTES # BLD AUTO: 21.4 % — SIGNIFICANT CHANGE UP (ref 13–44)
MCHC RBC-ENTMCNC: 29.1 PG — SIGNIFICANT CHANGE UP (ref 27–34)
MCHC RBC-ENTMCNC: 32.8 GM/DL — SIGNIFICANT CHANGE UP (ref 32–36)
MCV RBC AUTO: 88.9 FL — SIGNIFICANT CHANGE UP (ref 80–100)
MONOCYTES # BLD AUTO: 0.83 K/UL — SIGNIFICANT CHANGE UP (ref 0–0.9)
MONOCYTES NFR BLD AUTO: 7.6 % — SIGNIFICANT CHANGE UP (ref 2–14)
NEUTROPHILS # BLD AUTO: 7.23 K/UL — SIGNIFICANT CHANGE UP (ref 1.8–7.4)
NEUTROPHILS NFR BLD AUTO: 66.5 % — SIGNIFICANT CHANGE UP (ref 43–77)
PLATELET # BLD AUTO: 323 K/UL — SIGNIFICANT CHANGE UP (ref 150–400)
POTASSIUM SERPL-MCNC: 4 MMOL/L — SIGNIFICANT CHANGE UP (ref 3.5–5.3)
POTASSIUM SERPL-SCNC: 4 MMOL/L — SIGNIFICANT CHANGE UP (ref 3.5–5.3)
PROT SERPL-MCNC: 7.8 GM/DL — SIGNIFICANT CHANGE UP (ref 6–8.3)
PROTHROM AB SERPL-ACNC: 16.5 SEC — HIGH (ref 10.6–13.6)
RBC # BLD: 4.5 M/UL — SIGNIFICANT CHANGE UP (ref 4.2–5.8)
RBC # FLD: 14.6 % — HIGH (ref 10.3–14.5)
SODIUM SERPL-SCNC: 138 MMOL/L — SIGNIFICANT CHANGE UP (ref 135–145)
WBC # BLD: 10.86 K/UL — HIGH (ref 3.8–10.5)
WBC # FLD AUTO: 10.86 K/UL — HIGH (ref 3.8–10.5)

## 2021-10-26 PROCEDURE — 82150 ASSAY OF AMYLASE: CPT

## 2021-10-26 PROCEDURE — 83690 ASSAY OF LIPASE: CPT

## 2021-10-26 PROCEDURE — 80053 COMPREHEN METABOLIC PANEL: CPT

## 2021-10-26 PROCEDURE — 86850 RBC ANTIBODY SCREEN: CPT

## 2021-10-26 PROCEDURE — 85610 PROTHROMBIN TIME: CPT

## 2021-10-26 PROCEDURE — 85730 THROMBOPLASTIN TIME PARTIAL: CPT

## 2021-10-26 PROCEDURE — 36415 COLL VENOUS BLD VENIPUNCTURE: CPT

## 2021-10-26 PROCEDURE — 86901 BLOOD TYPING SEROLOGIC RH(D): CPT

## 2021-10-26 PROCEDURE — 86900 BLOOD TYPING SEROLOGIC ABO: CPT

## 2021-10-26 PROCEDURE — 82248 BILIRUBIN DIRECT: CPT

## 2021-10-26 PROCEDURE — 85025 COMPLETE CBC W/AUTO DIFF WBC: CPT

## 2021-10-26 RX ORDER — APIXABAN 2.5 MG/1
1 TABLET, FILM COATED ORAL
Qty: 0 | Refills: 0 | DISCHARGE

## 2021-10-26 NOTE — ASU PATIENT PROFILE, ADULT - NSICDXPASTMEDICALHX_GEN_ALL_CORE_FT
PAST MEDICAL HISTORY:  Afib     BPH (benign prostatic hyperplasia)     Cervical spinal stenosis     Deep vein thrombosis right leg s/p right knee replacement 2007 treated with coumadin x 6 months    Depression     Gallstones     Gastroesophageal reflux disease     Hyperlipidemia     Hypertension     Osteoarthritis     Poison ivy both arms---started October 23, 2021, on Medrol rocio

## 2021-10-26 NOTE — ASU PATIENT PROFILE, ADULT - NSICDXPASTSURGICALHX_GEN_ALL_CORE_FT
PAST SURGICAL HISTORY:  H/O total knee replacement, bilateral left knee replacemt 2001, revision left knee replacement using cement 2010 ? h/o staph and strep infection , left revision due to prosthetic malfunction 2016  right knee replacement 2001 with revision x 4 ,  revision right knee replacement 2002?, multiple right  knee surgeries done due to staph infection, last revision 2014    H/O vasectomy     History of loop recorder 2019    S/P cataract surgery bilateral 2017    S/P hernia repair 2017

## 2021-10-26 NOTE — CHART NOTE - NSCHARTNOTEFT_GEN_A_CORE
10/26/2021--81 y.o male scheduled for Laparoscopic Cholecystectomy possible Open Cholecystectomy  VS: BP- 113/61  P-62   T-98.5  SpO2--94%  Plan  1. Stop all NSAIDS, herbal supplements and vitamins for 7 days.  2. NPO at midnight.  3. Take the following medications Losartan, Omeprazole, Metoprolol, Amiodarone  with small sips of water on the morning of your procedure/surgery.  4. Use EZ sponges as directed  5. Labs, EKG as per surgeon  6. PMD Diallo Murguia & Dr Clemente cardiology visit for optimization prior to surgery as per surgeon  7. COVID swab appt: 10/29/2021

## 2021-10-27 DIAGNOSIS — K80.18 CALCULUS OF GALLBLADDER WITH OTHER CHOLECYSTITIS WITHOUT OBSTRUCTION: ICD-10-CM

## 2021-10-27 DIAGNOSIS — Z01.818 ENCOUNTER FOR OTHER PREPROCEDURAL EXAMINATION: ICD-10-CM

## 2021-10-29 ENCOUNTER — APPOINTMENT (OUTPATIENT)
Dept: DISASTER EMERGENCY | Facility: CLINIC | Age: 81
End: 2021-10-29

## 2021-10-29 LAB — SARS-COV-2 N GENE NPH QL NAA+PROBE: NOT DETECTED

## 2021-10-29 RX ORDER — HYDROMORPHONE HYDROCHLORIDE 2 MG/ML
0.5 INJECTION INTRAMUSCULAR; INTRAVENOUS; SUBCUTANEOUS
Refills: 0 | Status: DISCONTINUED | OUTPATIENT
Start: 2021-11-01 | End: 2021-11-01

## 2021-10-29 RX ORDER — ONDANSETRON 8 MG/1
4 TABLET, FILM COATED ORAL ONCE
Refills: 0 | Status: DISCONTINUED | OUTPATIENT
Start: 2021-11-01 | End: 2021-11-01

## 2021-10-29 RX ORDER — MEPERIDINE HYDROCHLORIDE 50 MG/ML
12.5 INJECTION INTRAMUSCULAR; INTRAVENOUS; SUBCUTANEOUS
Refills: 0 | Status: DISCONTINUED | OUTPATIENT
Start: 2021-11-01 | End: 2021-11-01

## 2021-10-29 RX ORDER — SODIUM CHLORIDE 9 MG/ML
1000 INJECTION, SOLUTION INTRAVENOUS
Refills: 0 | Status: DISCONTINUED | OUTPATIENT
Start: 2021-11-01 | End: 2021-11-01

## 2021-10-29 RX ORDER — OXYCODONE HYDROCHLORIDE 5 MG/1
10 TABLET ORAL ONCE
Refills: 0 | Status: DISCONTINUED | OUTPATIENT
Start: 2021-11-01 | End: 2021-11-01

## 2021-10-29 RX ORDER — FENTANYL CITRATE 50 UG/ML
50 INJECTION INTRAVENOUS
Refills: 0 | Status: DISCONTINUED | OUTPATIENT
Start: 2021-11-01 | End: 2021-11-01

## 2021-10-29 NOTE — PATIENT PROFILE ADULT. - SPIRITUAL CULTURAL, RELIGIOUS PRACTICES/VALUES, PROFILE
Scripts sent and patient notified.
THE PHARMACY SENT THE REQUEST FOR THE REFILLS SHE NEEDS. [facility] IN [place]      SHE MADE HER PHYSICAL APPOINT MENT FOR 12/1/21.    SHE COULDN'T GIVE ME THE NAMES OF THE MEDS SHE NEEDS
none

## 2021-11-01 ENCOUNTER — OUTPATIENT (OUTPATIENT)
Dept: INPATIENT UNIT | Facility: HOSPITAL | Age: 81
LOS: 1 days | Discharge: HOME | End: 2021-11-01
Payer: MEDICARE

## 2021-11-01 ENCOUNTER — RESULT REVIEW (OUTPATIENT)
Age: 81
End: 2021-11-01

## 2021-11-01 VITALS
HEART RATE: 91 BPM | OXYGEN SATURATION: 98 % | DIASTOLIC BLOOD PRESSURE: 73 MMHG | RESPIRATION RATE: 14 BRPM | SYSTOLIC BLOOD PRESSURE: 124 MMHG | TEMPERATURE: 97 F

## 2021-11-01 VITALS
WEIGHT: 253.53 LBS | DIASTOLIC BLOOD PRESSURE: 86 MMHG | RESPIRATION RATE: 16 BRPM | HEART RATE: 78 BPM | TEMPERATURE: 97 F | SYSTOLIC BLOOD PRESSURE: 142 MMHG

## 2021-11-01 DIAGNOSIS — K80.18 CALCULUS OF GALLBLADDER WITH OTHER CHOLECYSTITIS WITHOUT OBSTRUCTION: ICD-10-CM

## 2021-11-01 DIAGNOSIS — Z98.49 CATARACT EXTRACTION STATUS, UNSPECIFIED EYE: Chronic | ICD-10-CM

## 2021-11-01 DIAGNOSIS — Z98.52 VASECTOMY STATUS: Chronic | ICD-10-CM

## 2021-11-01 DIAGNOSIS — Z96.653 PRESENCE OF ARTIFICIAL KNEE JOINT, BILATERAL: Chronic | ICD-10-CM

## 2021-11-01 DIAGNOSIS — Z98.890 OTHER SPECIFIED POSTPROCEDURAL STATES: Chronic | ICD-10-CM

## 2021-11-01 PROCEDURE — 88304 TISSUE EXAM BY PATHOLOGIST: CPT

## 2021-11-01 PROCEDURE — 88304 TISSUE EXAM BY PATHOLOGIST: CPT | Mod: 26

## 2021-11-01 RX ORDER — AMIODARONE HYDROCHLORIDE 400 MG/1
1 TABLET ORAL
Qty: 0 | Refills: 0 | DISCHARGE

## 2021-11-01 RX ORDER — FAMOTIDINE 10 MG/ML
20 INJECTION INTRAVENOUS ONCE
Refills: 0 | Status: COMPLETED | OUTPATIENT
Start: 2021-11-01 | End: 2021-11-01

## 2021-11-01 RX ORDER — METOPROLOL TARTRATE 50 MG
1 TABLET ORAL
Qty: 0 | Refills: 0 | DISCHARGE

## 2021-11-01 RX ORDER — CINNAMON BARK 500 MG
2000 CAPSULE ORAL
Qty: 0 | Refills: 0 | DISCHARGE

## 2021-11-01 RX ORDER — ATORVASTATIN CALCIUM 80 MG/1
1 TABLET, FILM COATED ORAL
Qty: 0 | Refills: 0 | DISCHARGE

## 2021-11-01 RX ORDER — OMEGA-3 ACID ETHYL ESTERS 1 G
0 CAPSULE ORAL
Qty: 0 | Refills: 0 | DISCHARGE

## 2021-11-01 RX ORDER — CITALOPRAM 10 MG/1
1 TABLET, FILM COATED ORAL
Qty: 0 | Refills: 0 | DISCHARGE

## 2021-11-01 RX ORDER — TAMSULOSIN HYDROCHLORIDE 0.4 MG/1
1 CAPSULE ORAL
Qty: 0 | Refills: 0 | DISCHARGE

## 2021-11-01 RX ORDER — PANTOPRAZOLE SODIUM 20 MG/1
0 TABLET, DELAYED RELEASE ORAL
Qty: 0 | Refills: 0 | DISCHARGE

## 2021-11-01 RX ORDER — UBIDECARENONE 100 MG
200 CAPSULE ORAL
Qty: 0 | Refills: 0 | DISCHARGE

## 2021-11-01 RX ORDER — LOSARTAN/HYDROCHLOROTHIAZIDE 100MG-25MG
0 TABLET ORAL
Qty: 0 | Refills: 0 | DISCHARGE

## 2021-11-01 RX ORDER — METOCLOPRAMIDE HCL 10 MG
10 TABLET ORAL ONCE
Refills: 0 | Status: COMPLETED | OUTPATIENT
Start: 2021-11-01 | End: 2021-11-01

## 2021-11-01 RX ORDER — APIXABAN 2.5 MG/1
1 TABLET, FILM COATED ORAL
Qty: 0 | Refills: 0 | DISCHARGE

## 2021-11-01 RX ORDER — CHOLECALCIFEROL (VITAMIN D3) 125 MCG
0 CAPSULE ORAL
Qty: 0 | Refills: 0 | DISCHARGE

## 2021-11-01 RX ADMIN — Medication 10 MILLIGRAM(S): at 11:14

## 2021-11-01 RX ADMIN — OXYCODONE HYDROCHLORIDE 10 MILLIGRAM(S): 5 TABLET ORAL at 14:35

## 2021-11-01 RX ADMIN — SODIUM CHLORIDE 75 MILLILITER(S): 9 INJECTION, SOLUTION INTRAVENOUS at 14:02

## 2021-11-01 RX ADMIN — FAMOTIDINE 20 MILLIGRAM(S): 10 INJECTION INTRAVENOUS at 11:14

## 2021-11-01 RX ADMIN — OXYCODONE HYDROCHLORIDE 10 MILLIGRAM(S): 5 TABLET ORAL at 14:11

## 2021-11-01 NOTE — ASU DISCHARGE PLAN (ADULT/PEDIATRIC) - CARE PROVIDER_API CALL
Walter Nuno)  Surgery; Surgical Critical Care  [address], [address]  [place] [address]  Phone: [phone]  Fax: [phone]  Follow Up Time: 2 weeks

## 2021-11-08 DIAGNOSIS — K82.8 OTHER SPECIFIED DISEASES OF GALLBLADDER: ICD-10-CM

## 2021-11-08 DIAGNOSIS — E78.5 HYPERLIPIDEMIA, UNSPECIFIED: ICD-10-CM

## 2021-11-08 DIAGNOSIS — K66.0 PERITONEAL ADHESIONS (POSTPROCEDURAL) (POSTINFECTION): ICD-10-CM

## 2021-11-08 DIAGNOSIS — F32.A DEPRESSION, UNSPECIFIED: ICD-10-CM

## 2021-11-08 DIAGNOSIS — I10 ESSENTIAL (PRIMARY) HYPERTENSION: ICD-10-CM

## 2021-11-08 DIAGNOSIS — K80.10 CALCULUS OF GALLBLADDER WITH CHRONIC CHOLECYSTITIS WITHOUT OBSTRUCTION: ICD-10-CM

## 2021-11-08 DIAGNOSIS — K21.9 GASTRO-ESOPHAGEAL REFLUX DISEASE WITHOUT ESOPHAGITIS: ICD-10-CM

## 2021-11-08 DIAGNOSIS — K80.20 CALCULUS OF GALLBLADDER WITHOUT CHOLECYSTITIS WITHOUT OBSTRUCTION: ICD-10-CM

## 2021-11-08 DIAGNOSIS — Z88.0 ALLERGY STATUS TO PENICILLIN: ICD-10-CM

## 2021-11-08 DIAGNOSIS — Z87.891 PERSONAL HISTORY OF NICOTINE DEPENDENCE: ICD-10-CM

## 2021-11-08 DIAGNOSIS — Z86.711 PERSONAL HISTORY OF PULMONARY EMBOLISM: ICD-10-CM

## 2021-11-08 DIAGNOSIS — Z79.01 LONG TERM (CURRENT) USE OF ANTICOAGULANTS: ICD-10-CM

## 2021-11-08 DIAGNOSIS — Z86.718 PERSONAL HISTORY OF OTHER VENOUS THROMBOSIS AND EMBOLISM: ICD-10-CM

## 2021-11-08 DIAGNOSIS — I48.91 UNSPECIFIED ATRIAL FIBRILLATION: ICD-10-CM

## 2021-11-08 DIAGNOSIS — Z88.5 ALLERGY STATUS TO NARCOTIC AGENT: ICD-10-CM

## 2021-11-08 DIAGNOSIS — Z96.653 PRESENCE OF ARTIFICIAL KNEE JOINT, BILATERAL: ICD-10-CM

## 2021-11-08 DIAGNOSIS — F41.9 ANXIETY DISORDER, UNSPECIFIED: ICD-10-CM

## 2021-11-08 DIAGNOSIS — K80.50 CALCULUS OF BILE DUCT WITHOUT CHOLANGITIS OR CHOLECYSTITIS WITHOUT OBSTRUCTION: ICD-10-CM

## 2021-11-30 ENCOUNTER — RX RENEWAL (OUTPATIENT)
Age: 81
End: 2021-11-30

## 2022-01-19 ENCOUNTER — RX RENEWAL (OUTPATIENT)
Age: 82
End: 2022-01-19

## 2022-08-29 ENCOUNTER — RX CHANGE (OUTPATIENT)
Age: 82
End: 2022-08-29

## 2023-02-20 ENCOUNTER — RX RENEWAL (OUTPATIENT)
Age: 83
End: 2023-02-20

## 2023-08-16 ENCOUNTER — RX RENEWAL (OUTPATIENT)
Age: 83
End: 2023-08-16

## 2023-08-23 PROBLEM — K80.20 CALCULUS OF GALLBLADDER WITHOUT CHOLECYSTITIS WITHOUT OBSTRUCTION: Chronic | Status: ACTIVE | Noted: 2021-10-26

## 2023-08-23 PROBLEM — L23.7 ALLERGIC CONTACT DERMATITIS DUE TO PLANTS, EXCEPT FOOD: Chronic | Status: ACTIVE | Noted: 2021-10-26

## 2023-08-23 PROBLEM — E78.5 HYPERLIPIDEMIA, UNSPECIFIED: Chronic | Status: ACTIVE | Noted: 2021-10-26

## 2023-08-23 PROBLEM — N40.0 BENIGN PROSTATIC HYPERPLASIA WITHOUT LOWER URINARY TRACT SYMPTOMS: Chronic | Status: ACTIVE | Noted: 2021-10-26

## 2023-08-23 PROBLEM — M48.02 SPINAL STENOSIS, CERVICAL REGION: Chronic | Status: ACTIVE | Noted: 2021-10-26

## 2023-11-03 ENCOUNTER — APPOINTMENT (OUTPATIENT)
Dept: GASTROENTEROLOGY | Facility: CLINIC | Age: 83
End: 2023-11-03
Payer: MEDICARE

## 2023-11-03 VITALS
HEIGHT: 72 IN | WEIGHT: 255 LBS | DIASTOLIC BLOOD PRESSURE: 70 MMHG | SYSTOLIC BLOOD PRESSURE: 118 MMHG | BODY MASS INDEX: 34.54 KG/M2

## 2023-11-03 DIAGNOSIS — Z86.010 PERSONAL HISTORY OF COLONIC POLYPS: ICD-10-CM

## 2023-11-03 DIAGNOSIS — I48.0 PAROXYSMAL ATRIAL FIBRILLATION: ICD-10-CM

## 2023-11-03 PROCEDURE — 99213 OFFICE O/P EST LOW 20 MIN: CPT

## 2024-02-09 ENCOUNTER — RX RENEWAL (OUTPATIENT)
Age: 84
End: 2024-02-09

## 2024-02-09 RX ORDER — PANTOPRAZOLE 40 MG/1
40 TABLET, DELAYED RELEASE ORAL
Qty: 180 | Refills: 1 | Status: ACTIVE | COMMUNITY
Start: 2021-04-13 | End: 1900-01-01

## 2024-02-20 ENCOUNTER — APPOINTMENT (OUTPATIENT)
Dept: GASTROENTEROLOGY | Facility: AMBULATORY MEDICAL SERVICES | Age: 84
End: 2024-02-20
Payer: MEDICARE

## 2024-02-20 ENCOUNTER — RESULT REVIEW (OUTPATIENT)
Age: 84
End: 2024-02-20

## 2024-02-20 PROCEDURE — 45382 COLONOSCOPY W/CONTROL BLEED: CPT | Mod: GC,59

## 2024-02-20 PROCEDURE — 45380 COLONOSCOPY AND BIOPSY: CPT | Mod: GC,59

## 2024-02-20 PROCEDURE — 45385 COLONOSCOPY W/LESION REMOVAL: CPT | Mod: GC

## 2024-04-12 NOTE — ED ADULT NURSE NOTE - NSFALLRSKPASTHIST_ED_ALL_ED
Called and left a VM. Will continue to try to reach pt.    Melanie Yoon, RN  Triage [facility]  04/12/24 15:03 EDT    
Dr. Orr/Dianna,    Pt called with a B/P update. She took two readings:    117/75, HR 95  108/63, HR 97    Thank you,    Melanie Yoon, RN  Triage [facility]  04/12/24 13:22 EDT    
Please make sure she decreased metoprolol as discussed yesterday (one tablet in AM and 1/2 tab in PM). Continue to monitor BP and HR at home and call next week with an update.  
Reviewed recommendations with patient, verbalized understanding, will call with any further questions or complaints.    Charmaine Winters RN  Triage Nurse  04/12/24 15:06 EDT    
yes

## 2024-05-09 ENCOUNTER — APPOINTMENT (OUTPATIENT)
Dept: OPHTHALMOLOGY | Facility: CLINIC | Age: 84
End: 2024-05-09
Payer: MEDICARE

## 2024-05-09 ENCOUNTER — NON-APPOINTMENT (OUTPATIENT)
Age: 84
End: 2024-05-09

## 2024-05-09 PROCEDURE — 92004 COMPRE OPH EXAM NEW PT 1/>: CPT

## 2024-05-09 PROCEDURE — 92134 CPTRZ OPH DX IMG PST SGM RTA: CPT

## 2024-08-05 NOTE — ASU PATIENT PROFILE, ADULT - REASON FOR ADMISSION, PROFILE
6. Does heartburn wake you from sleep? Response Scale: 0 = No Symptoms    7. Do you have difficulty swallowing? Response Scale: 1 = Noticeable, but no bothersome  8. Do you have pain with swallowing? Response Scale: 0 = No Symptoms  9. Do you have gassy or bloating feelings? Response Scale: 0 = No Symptoms  10. If you take medication, does this affect your daily life? Response Scale: 0 = No Symptoms    Regurgitation Questions  11. How bad is the regurgitation? Response Scale: 0 = No Symptoms  12. Regurgitation when lying down? Response Scale: 0 = No Symptoms  13. Regurgitation when standing up? Response Scale: 0 = No Symptoms  14. Regurgitation after meals? Response Scale: 0 = No Symptoms  15. Does regurgitation change your diet? Response Scale: 0 = No Symptoms  16. Does regurgitation wake you from sleep? Response Scale: 0 = No Symptoms    Are you currently taking any medications for heartburn or GERD? PPI: No  How satisfied are you with your present condition? Satisfaction: Satisfied    Total score (sum questions 1-16): 1  Greatest possible score 80 (worst symptoms).  Lowest possible score 0 (no symptoms).    Heartburn score (sum questions 1-6): 0  Worst heartburn symptoms: 30.  No heartburn symptoms: 0.  Score less than or equal to 12 with each individual question not exceeding 2 indicate heartburn elimination.    Regurgitation score (sum questions 11-16): 0  Worst regurgitation symptoms: 30.  No regurgitation symptoms: 0.  Score less than or equal to 12 with each individual question  not exceeding 2 indicate regurgitation elimination.     Gallstones

## 2024-08-14 ENCOUNTER — RX RENEWAL (OUTPATIENT)
Age: 84
End: 2024-08-14

## 2024-09-09 NOTE — H&P PST ADULT - RESPIRATORY AND THORAX
TCM Note:      Reviewed discharge summary/records.     First attempt to contact pt to check on condition since discharge from [facility] on 9/7/24 for dx of Pain of upper abdomen.      Assessment and support for treatment of regimen adherence and medication management:    The patient's appetite is normal.  Tolerating adequate food and fluids well.    Urinating as usual. Still having diarrhea for BM. States will contact GI as was instructed to do so at the hospital for possible colonoscopy.    Education provided on: Medication, follow-up appointments.     Reminded of pending hospital discharge follow up appt with Dr. Kong on 9/19/24 @ 11am.  Reminded to arrive 10 min early for appt.  Verbalized understanding.        negative

## 2024-11-07 ENCOUNTER — APPOINTMENT (OUTPATIENT)
Dept: OPHTHALMOLOGY | Facility: CLINIC | Age: 84
End: 2024-11-07

## 2025-03-09 NOTE — ASU DISCHARGE PLAN (ADULT/PEDIATRIC). - MEDICATION SUMMARY - MEDICATIONS TO CHANGE
ED HPI:  89-year-old male history of arthritis, BPH, coronary artery disease presents the emergency department via EMS after he had an episode of shaking after he stood up fast. Never had a postictal phase. Talking during the episode. Occurred twice today. Denies chest pain or shortness of breath. Here in the ER states he feels fine, no weakness. No slurred speech no other issues. Alert oriented x3, GCS is 15. Family reports he has a history of anemia in the past. Patient's only complaint is minor pain to his right hip area after he leaned against his wheelchair when he got dizzy earlier.     IM HPI:  Patient Tobi Liz Jr. is a 89 y.o. male with a PMHx of  has a past medical history of Aortic valve stenosis, Arthritis, BPH (benign prostatic hyperplasia), Carotid artery stenosis, Chronic diastolic heart failure, ETOH abuse, Exposure to COVID-19 virus (01/07/2022), Hyperchloremia, Kidney stones, Murmur, Polymyalgia rheumatica, PVD (peripheral vascular disease), and Renal artery stenosis., presents complaining of shaking after standing from a seated position that started yesterday, he was aware and talking during the episode so not likely a seizure. Labs show he was severely anemic in the ED with Hgb of 5. He had hematuria as well on UA along with bacteria. Ucx pending. His family states that he has a history of anemia in the past. He otherwise feels fine. He received 3 units of pRBCs in the ED with appropriate response.     I will SWITCH the dose or number of times a day I take the medications listed below when I get home from the hospital:  None

## 2025-03-28 ENCOUNTER — APPOINTMENT (OUTPATIENT)
Dept: ORTHOPEDIC SURGERY | Facility: CLINIC | Age: 85
End: 2025-03-28
Payer: MEDICARE

## 2025-03-28 VITALS
BODY MASS INDEX: 30.07 KG/M2 | HEIGHT: 72 IN | WEIGHT: 222 LBS | HEART RATE: 74 BPM | DIASTOLIC BLOOD PRESSURE: 75 MMHG | SYSTOLIC BLOOD PRESSURE: 119 MMHG

## 2025-03-28 DIAGNOSIS — Z96.651 PRESENCE OF RIGHT ARTIFICIAL KNEE JOINT: ICD-10-CM

## 2025-03-28 DIAGNOSIS — M47.816 SPONDYLOSIS W/OUT MYELOPATHY OR RADICULOPATHY, LUMBAR REGION: ICD-10-CM

## 2025-03-28 DIAGNOSIS — Z96.652 PRESENCE OF LEFT ARTIFICIAL KNEE JOINT: ICD-10-CM

## 2025-03-28 PROCEDURE — 73562 X-RAY EXAM OF KNEE 3: CPT | Mod: 50

## 2025-03-28 PROCEDURE — 99203 OFFICE O/P NEW LOW 30 MIN: CPT

## 2025-05-05 ENCOUNTER — RX RENEWAL (OUTPATIENT)
Age: 85
End: 2025-05-05

## 2025-06-30 ENCOUNTER — APPOINTMENT (OUTPATIENT)
Dept: OPHTHALMOLOGY | Facility: CLINIC | Age: 85
End: 2025-06-30
Payer: MEDICARE

## 2025-06-30 ENCOUNTER — NON-APPOINTMENT (OUTPATIENT)
Age: 85
End: 2025-06-30

## 2025-06-30 PROCEDURE — 92012 INTRM OPH EXAM EST PATIENT: CPT

## 2025-07-21 ENCOUNTER — NON-APPOINTMENT (OUTPATIENT)
Age: 85
End: 2025-07-21

## 2025-07-21 ENCOUNTER — APPOINTMENT (OUTPATIENT)
Dept: OPHTHALMOLOGY | Facility: CLINIC | Age: 85
End: 2025-07-21
Payer: MEDICARE

## 2025-07-21 PROCEDURE — 92134 CPTRZ OPH DX IMG PST SGM RTA: CPT

## 2025-07-21 PROCEDURE — 92014 COMPRE OPH EXAM EST PT 1/>: CPT

## 2025-07-31 ENCOUNTER — RX RENEWAL (OUTPATIENT)
Age: 85
End: 2025-07-31

## 2025-08-11 ENCOUNTER — APPOINTMENT (OUTPATIENT)
Dept: GASTROENTEROLOGY | Facility: CLINIC | Age: 85
End: 2025-08-11
Payer: MEDICARE

## 2025-08-11 VITALS
DIASTOLIC BLOOD PRESSURE: 80 MMHG | BODY MASS INDEX: 29.8 KG/M2 | WEIGHT: 220 LBS | SYSTOLIC BLOOD PRESSURE: 144 MMHG | HEIGHT: 72 IN

## 2025-08-11 DIAGNOSIS — Z76.0 ENCOUNTER FOR ISSUE OF REPEAT PRESCRIPTION: ICD-10-CM

## 2025-08-11 DIAGNOSIS — K21.9 GASTRO-ESOPHAGEAL REFLUX DISEASE W/OUT ESOPHAGITIS: ICD-10-CM

## 2025-08-11 PROCEDURE — 99214 OFFICE O/P EST MOD 30 MIN: CPT
